# Patient Record
Sex: FEMALE | Employment: UNEMPLOYED | URBAN - METROPOLITAN AREA
[De-identification: names, ages, dates, MRNs, and addresses within clinical notes are randomized per-mention and may not be internally consistent; named-entity substitution may affect disease eponyms.]

---

## 2024-07-09 ENCOUNTER — OFFICE VISIT (OUTPATIENT)
Dept: OBGYN CLINIC | Facility: CLINIC | Age: 29
End: 2024-07-09

## 2024-07-09 VITALS
HEIGHT: 60 IN | WEIGHT: 175 LBS | SYSTOLIC BLOOD PRESSURE: 104 MMHG | DIASTOLIC BLOOD PRESSURE: 62 MMHG | BODY MASS INDEX: 34.36 KG/M2 | HEART RATE: 88 BPM

## 2024-07-09 DIAGNOSIS — R10.2 PELVIC PAIN: Primary | ICD-10-CM

## 2024-07-09 DIAGNOSIS — Z30.015 ENCOUNTER FOR INITIAL PRESCRIPTION OF VAGINAL RING HORMONAL CONTRACEPTIVE: ICD-10-CM

## 2024-07-09 DIAGNOSIS — Z11.3 SCREEN FOR STD (SEXUALLY TRANSMITTED DISEASE): ICD-10-CM

## 2024-07-09 LAB — SL AMB POCT URINE HCG: NEGATIVE

## 2024-07-09 PROCEDURE — 99203 OFFICE O/P NEW LOW 30 MIN: CPT | Performed by: OBSTETRICS & GYNECOLOGY

## 2024-07-09 PROCEDURE — 87491 CHLMYD TRACH DNA AMP PROBE: CPT

## 2024-07-09 PROCEDURE — 87591 N.GONORRHOEAE DNA AMP PROB: CPT

## 2024-07-09 PROCEDURE — 81025 URINE PREGNANCY TEST: CPT | Performed by: OBSTETRICS & GYNECOLOGY

## 2024-07-09 RX ORDER — ETONOGESTREL AND ETHINYL ESTRADIOL VAGINAL RING .015; .12 MG/D; MG/D
RING VAGINAL
Qty: 3 EACH | Refills: 4 | Status: SHIPPED | OUTPATIENT
Start: 2024-07-09

## 2024-07-09 NOTE — PROGRESS NOTES
Assessment Sherlyn was seen today for pelvic pain.    Diagnoses and all orders for this visit:    Pelvic pain  -     Chlamydia/GC amplified DNA by PCR  -     POCT urine HCG    Encounter for initial prescription of vaginal ring hormonal contraceptive  -     etonogestrel-ethinyl estradiol (NuvaRing) 0.12-0.015 MG/24HR vaginal ring; Insert vaginally and leave in place for 3 consecutive weeks, then remove for 1 week.  -     US pelvis complete non OB; Future  -     POCT urine HCG    Screen for STD (sexually transmitted disease)  -     Chlamydia/GC amplified DNA by PCR      Plan  Recommend pelvic US and conservative management of pain with tylenol, motrin and heating pads  UPT today was negative. Discussed safe sex practices and contraception, patient interested in Nuvaring. Rx sent to pharmacy  GC collected for STI screening.   Recommend f/u in 3-6 months for follow up to pain.     Subjective     Sherlyn Lozano is a 28 y.o. female here for a problem visit. She complains of right sided pelvic pain for the past 5 months, since November 2023. She says that her pain occasionally comes and goes and is occasionally constant. She has only tried teas for her pain with no resolution. She states that her pain is associated with menses. Her LMP was 5/02/2024. She states her menses are usually regular and last 4 days. She was previously using Depo for contraception with last injection in Feb 2024. She does not want to get pregnant but uses no contraception.     She declined [employer] , and requested her friend interpret for her.     There is no problem list on file for this patient.    Gynecologic History  Patient's last menstrual period was 05/02/2024 (approximate).  The current method of family planning is none.    History reviewed. No pertinent past medical history.  History reviewed. No pertinent surgical history.  Family History   Problem Relation Age of Onset    Hypertension Mother     No Known Problems Father      Social  History     Socioeconomic History    Marital status: Single     Spouse name: Not on file    Number of children: Not on file    Years of education: Not on file    Highest education level: Not on file   Occupational History    Not on file   Tobacco Use    Smoking status: Never    Smokeless tobacco: Never   Vaping Use    Vaping status: Never Used   Substance and Sexual Activity    Alcohol use: Never    Drug use: Never    Sexual activity: Yes     Partners: Male     Birth control/protection: None   Other Topics Concern    Not on file   Social History Narrative    Not on file     Social Determinants of Health     Financial Resource Strain: Medium Risk (7/9/2024)    Overall Financial Resource Strain (CARDIA)     Difficulty of Paying Living Expenses: Somewhat hard   Food Insecurity: No Food Insecurity (7/9/2024)    Hunger Vital Sign     Worried About Running Out of Food in the Last Year: Never true     Ran Out of Food in the Last Year: Never true   Transportation Needs: No Transportation Needs (7/9/2024)    PRAPARE - Transportation     Lack of Transportation (Medical): No     Lack of Transportation (Non-Medical): No   Physical Activity: Not on file   Stress: Not on file   Social Connections: Not on file   Intimate Partner Violence: Not on file   Housing Stability: High Risk (7/9/2024)    Housing Stability Vital Sign     Unable to Pay for Housing in the Last Year: Yes     Number of Times Moved in the Last Year: 2     Homeless in the Last Year: No     Patient has no known allergies.  No current outpatient medications on file.    Review of Systems  Review of Systems - Negative except what's listed in HPI     Objective     /62   Pulse 88   Ht 5' (1.524 m)   Wt 79.4 kg (175 lb)   LMP 05/02/2024 (Approximate)   BMI 34.18 kg/m²     Physical Exam  Constitutional:       Appearance: Normal appearance.   Cardiovascular:      Rate and Rhythm: Normal rate.      Pulses: Normal pulses.   Pulmonary:      Effort: Pulmonary effort  is normal. No respiratory distress.      Breath sounds: Normal breath sounds.   Abdominal:      Palpations: Abdomen is soft.      Tenderness: There is no abdominal tenderness.   Neurological:      Mental Status: She is alert.   Skin:     General: Skin is warm and dry.   Psychiatric:         Mood and Affect: Mood normal.         Behavior: Behavior normal.   Vitals reviewed.       Jane Ponce MD  OB/GYN PGY-4  7/9/2024  10:49 AM

## 2024-07-10 LAB
C TRACH DNA SPEC QL NAA+PROBE: NEGATIVE
N GONORRHOEA DNA SPEC QL NAA+PROBE: NEGATIVE

## 2025-01-21 ENCOUNTER — PATIENT OUTREACH (OUTPATIENT)
Dept: OBGYN CLINIC | Facility: CLINIC | Age: 30
End: 2025-01-21

## 2025-01-21 ENCOUNTER — ULTRASOUND (OUTPATIENT)
Dept: OBGYN CLINIC | Facility: CLINIC | Age: 30
End: 2025-01-21

## 2025-01-21 VITALS
WEIGHT: 171.2 LBS | DIASTOLIC BLOOD PRESSURE: 74 MMHG | RESPIRATION RATE: 18 BRPM | HEIGHT: 60 IN | BODY MASS INDEX: 33.61 KG/M2 | SYSTOLIC BLOOD PRESSURE: 115 MMHG | HEART RATE: 93 BPM

## 2025-01-21 DIAGNOSIS — Z59.9 FINANCIAL DIFFICULTIES: ICD-10-CM

## 2025-01-21 DIAGNOSIS — Z3A.13 13 WEEKS GESTATION OF PREGNANCY: Primary | ICD-10-CM

## 2025-01-21 PROCEDURE — 99213 OFFICE O/P EST LOW 20 MIN: CPT | Performed by: OBSTETRICS & GYNECOLOGY

## 2025-01-21 PROCEDURE — 76801 OB US < 14 WKS SINGLE FETUS: CPT | Performed by: OBSTETRICS & GYNECOLOGY

## 2025-01-21 RX ORDER — PNV NO.95/FERROUS FUM/FOLIC AC 28MG-0.8MG
1 TABLET ORAL DAILY
Qty: 90 TABLET | Refills: 2 | Status: SHIPPED | OUTPATIENT
Start: 2025-01-21

## 2025-01-21 SDOH — ECONOMIC STABILITY - INCOME SECURITY: PROBLEM RELATED TO HOUSING AND ECONOMIC CIRCUMSTANCES, UNSPECIFIED: Z59.9

## 2025-01-21 NOTE — PROGRESS NOTES
[facility] received a referral in regard to new prenatal pt. [facility] attempted to reach pt today using [employer] services,  # [id number]. [facility] was unable to reach pt and a message was left to please return [facility] call. [facility] will remain available.

## 2025-01-21 NOTE — PROGRESS NOTES
Viability Scan    S: 29 y.o.y.o.  who presents for viability scan with approximate LMP of 10/20/24. She would be 13 weeks and 2 days by her LMP. She denies cramping or vaginal bleeding. This is an unplanned but welcomed pregnancy. This is her second pregnancy. Her last delivery in  was a term uncomplicated vaginal delivery..      O:  Vitals:    25 0848   BP: 115/74   BP Location: Left arm   Patient Position: Sitting   Cuff Size: Large   Pulse: 93   Resp: 18   Weight: 77.7 kg (171 lb 3.2 oz)   Height: 5' (1.524 m)       TAUS: viable, boggs IUP measuring 13 weeks 6 days   bpm.      CRL 8.04 cm GA 13w6d                         A/P:  #1. IUP at 13 weeks and 6 days dated by ultrasound  - Viable pregnancy on TAUS  - RT for nurse intake visit      Horacio Duque MD  OB/GYN PGY-3  2025  9:52 AM

## 2025-01-29 ENCOUNTER — INITIAL PRENATAL (OUTPATIENT)
Dept: OBGYN CLINIC | Facility: CLINIC | Age: 30
End: 2025-01-29

## 2025-01-29 DIAGNOSIS — Z34.92 PRENATAL CARE IN SECOND TRIMESTER: Primary | ICD-10-CM

## 2025-01-29 PROCEDURE — 99211 OFF/OP EST MAY X REQ PHY/QHP: CPT

## 2025-01-29 NOTE — PROGRESS NOTES
OB INTAKE INTERVIEW  Pt presents for OB intake.     OB History    Para Term  AB Living   2 1 1 0 0 1   SAB IAB Ectopic Multiple Live Births             # Outcome Date GA Lbr Nael/2nd Weight Sex Type Anes PTL Lv   2 Current            1 Term               Obstetric Comments   NSVDx1     Hx of  delivery prior to 36 weeks 6 days:  No   If yes, place a referral for cervical surveillance at 16 weeks.   Last Menstrual Period:    Patient's last menstrual period was 10/20/2024 (approximate).     Ultrasound date: 2025  13 weeks 6 days     Estimated Date of Delivery: 25   by LMP or US  H&P visit scheduled. 2025 @ 0800  with Dr. Mathis     Last pap smear: unknown date     Current Issues:  Constipation :   No  Headaches :   Yes, uses tylenol.  Cramping:  Yes  Spotting :   No  PICA cravings :  No  FOB Involved:   Yes  Planned pregnancy:  Yes  I have these concerns about this prenatal patient:   Phone intake. Pt declined [employer]  and her bilingual friend provided interpretation instead  Reminded to complete 1st trimester labs  Offer flu vaccine next visit  Desires breastfeeding. Self pay. Unable to order breast pump.      Interview education  [facility] Pregnancy Essentials reviewed and discussed   [facility] Handout  [facility] MFM Handout  Discussed genetic testing  Prenatal lab work: Scripts printed and given to pt.   Influenza vaccine given today: No  Discussed Tdap vaccine.   Immunizations:   There is no immunization history on file for this patient.  Depression Screening Follow-up Plan: Patient's depression screening was negative with an Hollis score of   0  Clinically patient does not have depression. No treatment is required..  Nurse/Family Partnership- referral placed:  Yes   If yes, place referral for nurse family partnership  Tobacco Cessation Counseling: non-smoker  Infection Screening: Does the pt have a hx of MRSA? No  If yes- please  follow MRSA protocol and obtain a nasal swab for MRSA culture  The patient was oriented to our practice and all questions were answered.  Interviewed by: zully mccray RN 01/29/25

## 2025-01-29 NOTE — LETTER
[facility] Letter       01/29/25       Sherlyn Momin  YOB: 1995  [address]  [facility] [address]         Sherlyn Momin is our patient and under our office's care.  Sherlyn's Estimated Date of Delivery: 7/23/25    Any questions or concerns feel free to contact our office.           Thank you,   [facility] [place]     [phone]   [facility]/[place]     [phone]        [facility]/[place]    [phone]    [facility]     [phone]        [facility]     [phone]        [facility]  [facility]     [phone]        [facility]     [phone]        [facility]     [phone]   [place]     [phone]   [place]     [phone]

## 2025-02-06 ENCOUNTER — PATIENT OUTREACH (OUTPATIENT)
Dept: OBGYN CLINIC | Facility: CLINIC | Age: 30
End: 2025-02-06

## 2025-02-07 ENCOUNTER — PATIENT OUTREACH (OUTPATIENT)
Dept: OBGYN CLINIC | Facility: CLINIC | Age: 30
End: 2025-02-07

## 2025-02-10 ENCOUNTER — PATIENT OUTREACH (OUTPATIENT)
Dept: OBGYN CLINIC | Facility: CLINIC | Age: 30
End: 2025-02-10

## 2025-02-11 ENCOUNTER — APPOINTMENT (OUTPATIENT)
Dept: LAB | Facility: CLINIC | Age: 30
End: 2025-02-11

## 2025-02-11 ENCOUNTER — ROUTINE PRENATAL (OUTPATIENT)
Dept: PERINATAL CARE | Facility: CLINIC | Age: 30
End: 2025-02-11

## 2025-02-11 VITALS
HEIGHT: 60 IN | BODY MASS INDEX: 34.2 KG/M2 | DIASTOLIC BLOOD PRESSURE: 60 MMHG | HEART RATE: 74 BPM | OXYGEN SATURATION: 98 % | WEIGHT: 174.2 LBS | SYSTOLIC BLOOD PRESSURE: 108 MMHG

## 2025-02-11 DIAGNOSIS — Z3A.13 13 WEEKS GESTATION OF PREGNANCY: ICD-10-CM

## 2025-02-11 DIAGNOSIS — Z36.3 ENCOUNTER FOR ANTENATAL SCREENING FOR MALFORMATION: Primary | ICD-10-CM

## 2025-02-11 DIAGNOSIS — Z3A.16 16 WEEKS GESTATION OF PREGNANCY: ICD-10-CM

## 2025-02-11 LAB
ABO GROUP BLD: NORMAL
BASOPHILS # BLD AUTO: 0.05 THOUSANDS/ÂΜL (ref 0–0.1)
BASOPHILS NFR BLD AUTO: 1 % (ref 0–1)
BILIRUB UR QL STRIP: NEGATIVE
BLD GP AB SCN SERPL QL: NEGATIVE
CLARITY UR: CLEAR
COLOR UR: COLORLESS
EOSINOPHIL # BLD AUTO: 0.07 THOUSAND/ÂΜL (ref 0–0.61)
EOSINOPHIL NFR BLD AUTO: 1 % (ref 0–6)
ERYTHROCYTE [DISTWIDTH] IN BLOOD BY AUTOMATED COUNT: 13.7 % (ref 11.6–15.1)
GLUCOSE UR STRIP-MCNC: NEGATIVE MG/DL
HCT VFR BLD AUTO: 35.5 % (ref 34.8–46.1)
HGB BLD-MCNC: 12.1 G/DL (ref 11.5–15.4)
HGB UR QL STRIP.AUTO: NEGATIVE
IMM GRANULOCYTES # BLD AUTO: 0.06 THOUSAND/UL (ref 0–0.2)
IMM GRANULOCYTES NFR BLD AUTO: 1 % (ref 0–2)
KETONES UR STRIP-MCNC: NEGATIVE MG/DL
LEUKOCYTE ESTERASE UR QL STRIP: NEGATIVE
LYMPHOCYTES # BLD AUTO: 3.31 THOUSANDS/ÂΜL (ref 0.6–4.47)
LYMPHOCYTES NFR BLD AUTO: 31 % (ref 14–44)
MCH RBC QN AUTO: 29.8 PG (ref 26.8–34.3)
MCHC RBC AUTO-ENTMCNC: 34.1 G/DL (ref 31.4–37.4)
MCV RBC AUTO: 87 FL (ref 82–98)
MONOCYTES # BLD AUTO: 0.69 THOUSAND/ÂΜL (ref 0.17–1.22)
MONOCYTES NFR BLD AUTO: 7 % (ref 4–12)
NEUTROPHILS # BLD AUTO: 6.43 THOUSANDS/ÂΜL (ref 1.85–7.62)
NEUTS SEG NFR BLD AUTO: 59 % (ref 43–75)
NITRITE UR QL STRIP: NEGATIVE
NRBC BLD AUTO-RTO: 0 /100 WBCS
PH UR STRIP.AUTO: 7 [PH]
PLATELET # BLD AUTO: 377 THOUSANDS/UL (ref 149–390)
PMV BLD AUTO: 9.2 FL (ref 8.9–12.7)
PROT UR STRIP-MCNC: NEGATIVE MG/DL
RBC # BLD AUTO: 4.06 MILLION/UL (ref 3.81–5.12)
RH BLD: POSITIVE
RUBV IGG SERPL IA-ACNC: 34.5 IU/ML
SP GR UR STRIP.AUTO: 1 (ref 1–1.03)
SPECIMEN EXPIRATION DATE: NORMAL
UROBILINOGEN UR STRIP-ACNC: <2 MG/DL
WBC # BLD AUTO: 10.61 THOUSAND/UL (ref 4.31–10.16)

## 2025-02-11 PROCEDURE — 36415 COLL VENOUS BLD VENIPUNCTURE: CPT

## 2025-02-11 PROCEDURE — 76805 OB US >/= 14 WKS SNGL FETUS: CPT | Performed by: STUDENT IN AN ORGANIZED HEALTH CARE EDUCATION/TRAINING PROGRAM

## 2025-02-11 PROCEDURE — 85025 COMPLETE CBC W/AUTO DIFF WBC: CPT

## 2025-02-11 PROCEDURE — 87086 URINE CULTURE/COLONY COUNT: CPT

## 2025-02-11 PROCEDURE — 86900 BLOOD TYPING SEROLOGIC ABO: CPT

## 2025-02-11 PROCEDURE — 86850 RBC ANTIBODY SCREEN: CPT

## 2025-02-11 PROCEDURE — 81003 URINALYSIS AUTO W/O SCOPE: CPT

## 2025-02-11 PROCEDURE — 86901 BLOOD TYPING SEROLOGIC RH(D): CPT

## 2025-02-11 PROCEDURE — 86762 RUBELLA ANTIBODY: CPT

## 2025-02-11 PROCEDURE — 87389 HIV-1 AG W/HIV-1&-2 AB AG IA: CPT

## 2025-02-11 PROCEDURE — 86780 TREPONEMA PALLIDUM: CPT

## 2025-02-11 PROCEDURE — 87340 HEPATITIS B SURFACE AG IA: CPT

## 2025-02-11 PROCEDURE — 86706 HEP B SURFACE ANTIBODY: CPT

## 2025-02-11 PROCEDURE — 86803 HEPATITIS C AB TEST: CPT

## 2025-02-11 PROCEDURE — 99243 OFF/OP CNSLTJ NEW/EST LOW 30: CPT | Performed by: STUDENT IN AN ORGANIZED HEALTH CARE EDUCATION/TRAINING PROGRAM

## 2025-02-11 NOTE — PROGRESS NOTES
This patient received  care under my supervision on 25 at 16w6d gestational age at [facility].  The note is contained in the ultrasound report located under OB Procedures tab in Epic.  Please call our office at [phone] with questions.  -Loli Fry MD

## 2025-02-12 LAB
BACTERIA UR CULT: NORMAL
HBV SURFACE AB SER-ACNC: <3 MIU/ML
HBV SURFACE AG SER QL: NORMAL
HCV AB SER QL: NORMAL
HIV 1+2 AB+HIV1 P24 AG SERPL QL IA: NORMAL
TREPONEMA PALLIDUM IGG+IGM AB [PRESENCE] IN SERUM OR PLASMA BY IMMUNOASSAY: NEGATIVE

## 2025-02-15 PROBLEM — Z78.9 NONIMMUNE TO HEPATITIS B VIRUS: Status: ACTIVE | Noted: 2025-02-15

## 2025-02-15 PROBLEM — Z3A.17 17 WEEKS GESTATION OF PREGNANCY: Status: ACTIVE | Noted: 2025-02-15

## 2025-02-15 NOTE — PROGRESS NOTES
OB/GYN PRENATAL H&P  Sherlyn Momin  2025  8:23 AM  Dr. Loli Mathis MD     [id number] used to conduct visit.     SUBJECTIVE:  Sherlyn Momin is a 29 y.o.  at 17w5d presenting for initial prenatal H&P. This is an unintended but desired pregnancy dated by first trimester ultrasound. Today, she has no complaints and denies cramping/contractions, vaginal bleeding, loss of fluid. She notes some occasional right sided pelvic pain that radiates to her groin and is worse with standing and certain positions.     She also has a cough that started on Friday and some nasal congestion. Denies fevers, chills, shortness of breath, chest pain.     STI history: none  History of or exposure to TB: none  History of MRSA: none  Family history of developmental delay, intellectual disability, or inheritable conditions: none  Recent/future travel outside of the country: none  Substance use this pregnancy (e.g., alcohol, smoking, vaping, marijuana, heroine, cocaine, other substances): none      OB History    Para Term  AB Living   2 1 1 0 0 1   SAB IAB Ectopic Multiple Live Births   0 0 0 0 0      # Outcome Date GA Lbr Nael/2nd Weight Sex Type Anes PTL Lv   2 Current            1 Term               Obstetric Comments   NSVDx1       Review of Systems   Constitutional:  Negative for chills and fever.   Respiratory:  Positive for cough. Negative for shortness of breath.    Cardiovascular:  Negative for chest pain.   Gastrointestinal:  Negative for abdominal pain.   Genitourinary:  Negative for vaginal bleeding.   Psychiatric/Behavioral: Negative.         History reviewed. No pertinent past medical history.    History reviewed. No pertinent surgical history.    Social History     Socioeconomic History    Marital status: Single     Spouse name: Not on file    Number of children: Not on file    Years of education: Not on file    Highest education level: Not on file   Occupational History    Not  on file   Tobacco Use    Smoking status: Never    Smokeless tobacco: Never   Vaping Use    Vaping status: Never Used   Substance and Sexual Activity    Alcohol use: Never    Drug use: Never    Sexual activity: Yes     Partners: Male     Birth control/protection: None   Other Topics Concern    Not on file   Social History Narrative    Not on file     Social Drivers of Health     Financial Resource Strain: Medium Risk (1/21/2025)    Overall Financial Resource Strain (CARDIA)     Difficulty of Paying Living Expenses: Somewhat hard   Food Insecurity: No Food Insecurity (1/21/2025)    Hunger Vital Sign     Worried About Running Out of Food in the Last Year: Never true     Ran Out of Food in the Last Year: Never true   Transportation Needs: No Transportation Needs (1/21/2025)    PRAPARE - Transportation     Lack of Transportation (Medical): No     Lack of Transportation (Non-Medical): No   Physical Activity: Not on file   Stress: Not on file   Social Connections: Not on file   Intimate Partner Violence: Not on file   Housing Stability: Low Risk  (1/21/2025)    Housing Stability Vital Sign     Unable to Pay for Housing in the Last Year: No     Number of Times Moved in the Last Year: 1     Homeless in the Last Year: No       OBJECTIVE:    Vitals:  Vitals:    02/17/25 0812   BP: 95/61   Pulse: 97   Resp: 18       Weight gain: Patient's BMI is 33. Recommended weight gain is 11-20 lbs.       Physical Exam  Constitutional:       General: She is not in acute distress.     Appearance: Normal appearance.   Genitourinary:      Genitourinary Comments: Normal appearing external female genitalia, normal appearing urethral meatus. On speculum exam, normal appearing vaginal epithelium, no vaginal discharge, no bleeding, grossly normal appearing cervix, cervical ectropion noted.    HENT:      Head: Normocephalic and atraumatic.   Eyes:      Extraocular Movements: Extraocular movements intact.   Cardiovascular:      Rate and Rhythm:  Normal rate and regular rhythm.   Pulmonary:      Effort: Pulmonary effort is normal. No respiratory distress.      Breath sounds: Normal breath sounds. No stridor. No wheezing.   Neurological:      Mental Status: She is alert.   Psychiatric:         Mood and Affect: Mood normal.         Behavior: Behavior normal.       FHT: 147 bpm      ASSESSMENT / PLAN:    Sherlyn Momin is a 29 y.o.  at 17w5d presenting for initial prenatal H&P.    Problem List       17 weeks gestation of pregnancy    Overview   Labs  Pap smear collected 2025 and GC/CT collected 2025  Prenatal panel reviewed wnl except Hep B NI  MSAFP ordered 2025  Genetic screening: discussed options, will follow-up with [facility]  Vaccines:  Flu: will offer during flu season  Tdap: will offer after 27 weeks  Rhogam: not indicated, O pos  Contraception: **  Feeding plan: **  Birth plan:  with** epidural  Ultrasounds:   25 16w6d: anterior placenta, normal growth .  Next US 3/12/2025           Nonimmune to hepatitis B virus     Other Visit Diagnoses         Prenatal care in second trimester    -  Primary          Return to clinic in 4 weeks.       Loli Mathis MD  2025  8:23 AM

## 2025-02-17 ENCOUNTER — ROUTINE PRENATAL (OUTPATIENT)
Dept: OBGYN CLINIC | Facility: CLINIC | Age: 30
End: 2025-02-17

## 2025-02-17 VITALS
HEART RATE: 97 BPM | HEIGHT: 60 IN | WEIGHT: 169 LBS | DIASTOLIC BLOOD PRESSURE: 61 MMHG | RESPIRATION RATE: 18 BRPM | SYSTOLIC BLOOD PRESSURE: 95 MMHG | BODY MASS INDEX: 33.18 KG/M2

## 2025-02-17 DIAGNOSIS — Z3A.17 17 WEEKS GESTATION OF PREGNANCY: ICD-10-CM

## 2025-02-17 DIAGNOSIS — Z34.92 PRENATAL CARE IN SECOND TRIMESTER: Primary | ICD-10-CM

## 2025-02-17 PROCEDURE — 87491 CHLMYD TRACH DNA AMP PROBE: CPT

## 2025-02-17 PROCEDURE — G0145 SCR C/V CYTO,THINLAYER,RESCR: HCPCS

## 2025-02-17 PROCEDURE — 87591 N.GONORRHOEAE DNA AMP PROB: CPT

## 2025-02-17 PROCEDURE — 99213 OFFICE O/P EST LOW 20 MIN: CPT | Performed by: OBSTETRICS & GYNECOLOGY

## 2025-02-17 RX ORDER — BENZONATATE 100 MG/1
100 CAPSULE ORAL 3 TIMES DAILY PRN
Qty: 20 CAPSULE | Refills: 0 | Status: SHIPPED | OUTPATIENT
Start: 2025-02-17

## 2025-02-19 LAB
C TRACH DNA SPEC QL NAA+PROBE: NEGATIVE
N GONORRHOEA DNA SPEC QL NAA+PROBE: NEGATIVE

## 2025-02-26 LAB
LAB AP GYN PRIMARY INTERPRETATION: NORMAL
LAB AP LMP: NORMAL
Lab: NORMAL

## 2025-02-28 ENCOUNTER — TELEPHONE (OUTPATIENT)
Dept: OBGYN CLINIC | Facility: CLINIC | Age: 30
End: 2025-02-28

## 2025-02-28 NOTE — TELEPHONE ENCOUNTER
----- Message from Loli Mathis MD sent at 2/27/2025 11:22 AM EST -----  Hi -     Could you let this patient know her pap smear was normal and her gonorrhea and chlamydia were negative. Next pap will be due in 3 years. Thank you!      Loli Mathis       [id number] assisted in leaving a vm informing pt of normal pap and gcct results and the provider recommendation for a repeat pa in 3 yrs.

## 2025-03-12 ENCOUNTER — ROUTINE PRENATAL (OUTPATIENT)
Dept: PERINATAL CARE | Facility: CLINIC | Age: 30
End: 2025-03-12

## 2025-03-12 VITALS
BODY MASS INDEX: 33.65 KG/M2 | HEIGHT: 60 IN | SYSTOLIC BLOOD PRESSURE: 104 MMHG | HEART RATE: 74 BPM | WEIGHT: 171.4 LBS | OXYGEN SATURATION: 98 % | DIASTOLIC BLOOD PRESSURE: 56 MMHG

## 2025-03-12 DIAGNOSIS — Z36.3 ENCOUNTER FOR ANTENATAL SCREENING FOR MALFORMATIONS: Primary | ICD-10-CM

## 2025-03-12 DIAGNOSIS — Z3A.21 21 WEEKS GESTATION OF PREGNANCY: ICD-10-CM

## 2025-03-12 DIAGNOSIS — O99.212 OBESITY AFFECTING PREGNANCY IN SECOND TRIMESTER, UNSPECIFIED OBESITY TYPE: ICD-10-CM

## 2025-03-12 DIAGNOSIS — Z36.86 ENCOUNTER FOR ANTENATAL SCREENING FOR CERVICAL LENGTH: ICD-10-CM

## 2025-03-12 DIAGNOSIS — Z36.3 ENCOUNTER FOR ANTENATAL SCREENING FOR MALFORMATION: ICD-10-CM

## 2025-03-12 PROCEDURE — 99213 OFFICE O/P EST LOW 20 MIN: CPT | Performed by: NURSE PRACTITIONER

## 2025-03-12 PROCEDURE — 76817 TRANSVAGINAL US OBSTETRIC: CPT | Performed by: OBSTETRICS & GYNECOLOGY

## 2025-03-12 PROCEDURE — 76811 OB US DETAILED SNGL FETUS: CPT | Performed by: OBSTETRICS & GYNECOLOGY

## 2025-03-12 NOTE — PROGRESS NOTES
Ultrasound Probe Disinfection    A transvaginal ultrasound was performed.   Prior to use, disinfection was performed with High Level Disinfection Process ([employer]).  Probe serial number [id number] was used.    Brenna Hathaway  03/12/25  10:23 AM

## 2025-03-12 NOTE — PROGRESS NOTES
"[facility]: Sherlyn Momin was seen today for anatomic survey and cervical length screening ultrasound.  See ultrasound report under \"OB Procedures\" tab.  I interpreted the ultrasound. I supervised the advanced practitioner and agree with the medical decision making that is documented and was reviewed with me.  Please don't hesitate to contact our office with any concerns or questions.  -Cat King MD      "

## 2025-03-15 PROBLEM — Z3A.21 21 WEEKS GESTATION OF PREGNANCY: Status: ACTIVE | Noted: 2025-02-15

## 2025-03-15 PROBLEM — O99.210 OBESITY AFFECTING PREGNANCY: Status: ACTIVE | Noted: 2025-03-15

## 2025-03-20 ENCOUNTER — ROUTINE PRENATAL (OUTPATIENT)
Dept: OBGYN CLINIC | Facility: CLINIC | Age: 30
End: 2025-03-20

## 2025-03-20 VITALS
SYSTOLIC BLOOD PRESSURE: 104 MMHG | RESPIRATION RATE: 18 BRPM | HEIGHT: 60 IN | DIASTOLIC BLOOD PRESSURE: 64 MMHG | BODY MASS INDEX: 33.61 KG/M2 | HEART RATE: 82 BPM | WEIGHT: 171.2 LBS

## 2025-03-20 DIAGNOSIS — Z3A.22 22 WEEKS GESTATION OF PREGNANCY: Primary | ICD-10-CM

## 2025-03-20 NOTE — PROGRESS NOTES
OB/GYN prenatal visit    S: 29 y.o.  22w1d here for PN visit. She has obstetric complaints, including pelvic pain, contractions, vaginal bleeding, loss of fluid, or decreased fetal movement.     O:  Vitals:    25 0933   BP: 104/64   Pulse: 82   Resp: 18       General: AAOX3. No acute distress  Cardiovascular: Regular, Rate and Rhythm, no murmur, gallop or rub   Lungs: Clear to Auscultation Bilaterally, no wheezing, rhonchi or rales   Abdominal: Soft. NT/ND. Gravid    Fundal height: difficult to assess due to body habitus  FHT: 147 bpm      Plan discussed with Dr. Nguyen          A/P:  IUP at 22w1d  ANNABEL: 25  Labs  Pap smear collected 2025 and GC/CT collected 2025  Prenatal panel reviewed wnl except Hep B NI  MSAFP ordered 2025: not completed within timeframe  Genetic screening: discussed options, will follow-up with M  28 weeks labs ordered  Vaccines:  Flu: will offer during flu season  Tdap: will offer after 27 weeks  Rhogam: not indicated, O pos  Contraception: undecided  Feeding plan: breast feeding and formula feeding   Birth plan:  with epidural- remains undecided  Ultrasounds:   25 16w6d: anterior placenta, normal growth .  3/12/2025 20w6d: anterior placenta, normal growth, vertex  Recommend 32 w growth scan for obesity   - F/U in 4 weeks     OB History    Para Term  AB Living   2 1 1 0 0 1   SAB IAB Ectopic Multiple Live Births             # Outcome Date GA Lbr Nael/2nd Weight Sex Type Anes PTL Lv   2 Current            1 Term               Obstetric Comments   NSVDx1         COVID 19 precautions were discussed with patient at length, reviewed symptoms, hygiene, social distancing, patient to call office  with questions/concerns        eMrary Dunn MD  3/20/2025  2:50 PM

## 2025-04-14 ENCOUNTER — APPOINTMENT (OUTPATIENT)
Dept: LAB | Facility: CLINIC | Age: 30
End: 2025-04-14

## 2025-04-14 DIAGNOSIS — Z3A.22 22 WEEKS GESTATION OF PREGNANCY: ICD-10-CM

## 2025-04-14 DIAGNOSIS — Z34.92 PRENATAL CARE IN SECOND TRIMESTER: ICD-10-CM

## 2025-04-14 LAB
ERYTHROCYTE [DISTWIDTH] IN BLOOD BY AUTOMATED COUNT: 14.4 % (ref 11.6–15.1)
GLUCOSE 1H P 50 G GLC PO SERPL-MCNC: 156 MG/DL (ref 70–134)
HCT VFR BLD AUTO: 38.1 % (ref 34.8–46.1)
HGB BLD-MCNC: 12.6 G/DL (ref 11.5–15.4)
MCH RBC QN AUTO: 29.8 PG (ref 26.8–34.3)
MCHC RBC AUTO-ENTMCNC: 33.1 G/DL (ref 31.4–37.4)
MCV RBC AUTO: 90 FL (ref 82–98)
PLATELET # BLD AUTO: 404 THOUSANDS/UL (ref 149–390)
PMV BLD AUTO: 8.9 FL (ref 8.9–12.7)
RBC # BLD AUTO: 4.23 MILLION/UL (ref 3.81–5.12)
WBC # BLD AUTO: 9.03 THOUSAND/UL (ref 4.31–10.16)

## 2025-04-14 PROCEDURE — 82950 GLUCOSE TEST: CPT

## 2025-04-14 PROCEDURE — 36415 COLL VENOUS BLD VENIPUNCTURE: CPT

## 2025-04-14 PROCEDURE — 86780 TREPONEMA PALLIDUM: CPT

## 2025-04-14 PROCEDURE — 85027 COMPLETE CBC AUTOMATED: CPT

## 2025-04-14 PROCEDURE — 82105 ALPHA-FETOPROTEIN SERUM: CPT

## 2025-04-15 LAB — TREPONEMA PALLIDUM IGG+IGM AB [PRESENCE] IN SERUM OR PLASMA BY IMMUNOASSAY: NORMAL

## 2025-04-16 LAB
2ND TRIMESTER 4 SCREEN SERPL-IMP: NORMAL
AFP ADJ MOM SERPL: NORMAL
AFP INTERP AMN-IMP: NORMAL
AFP INTERP SERPL-IMP: NORMAL
AFP INTERP SERPL-IMP: NORMAL
AFP SERPL-MCNC: 50.2 NG/ML
AGE AT DELIVERY: 27.7 YR
GA METHOD: NORMAL
GA: 25.9 WEEKS
IDDM PATIENT QL: NO
MULTIPLE PREGNANCY: NO
NEURAL TUBE DEFECT RISK FETUS: NORMAL %

## 2025-04-17 ENCOUNTER — ROUTINE PRENATAL (OUTPATIENT)
Dept: OBGYN CLINIC | Facility: CLINIC | Age: 30
End: 2025-04-17

## 2025-04-17 VITALS
BODY MASS INDEX: 34.47 KG/M2 | HEIGHT: 60 IN | SYSTOLIC BLOOD PRESSURE: 111 MMHG | WEIGHT: 175.6 LBS | HEART RATE: 77 BPM | DIASTOLIC BLOOD PRESSURE: 71 MMHG

## 2025-04-17 DIAGNOSIS — Z34.82 PRENATAL CARE, SUBSEQUENT PREGNANCY IN SECOND TRIMESTER: ICD-10-CM

## 2025-04-17 DIAGNOSIS — Z3A.26 26 WEEKS GESTATION OF PREGNANCY: Primary | ICD-10-CM

## 2025-04-17 DIAGNOSIS — R73.09 ELEVATED GLUCOSE TOLERANCE TEST: ICD-10-CM

## 2025-04-17 PROCEDURE — 99213 OFFICE O/P EST LOW 20 MIN: CPT | Performed by: NURSE PRACTITIONER

## 2025-04-17 NOTE — PROGRESS NOTES
Sherlyn Momin presents today for routine OB visit at 26w1d. She is a  with a history of one full term .     Blood Pressure: 111/71  Wt=79.7 kg (175 lb 9.6 oz); Body mass index is 34.29 kg/m².; TWG=2.087 kg (4 lb 9.6 oz)  Fetal Heart Rate: 145; Fundal Height (cm): 27 cm  Abdomen: gravid, soft, non-tender.  She reports no complaints.  Denies uterine contractions or persistent cramping.  Denies vaginal bleeding or leaking of fluid.  Reports fetal movement.  Scheduled for ultrasound 25.    Reviewed elevated 1 hour GTT. Orders placed for 3 hr GTT.   Reviewed common discomforts of pregnancy in second trimester and warning signs.  Advised to continue medications and return in 2 weeks.      Current Outpatient Medications   Medication Instructions    benzonatate (TESSALON PERLES) 100 mg, Oral, 3 times daily PRN    Prenatal Vit-Fe Fumarate-FA (Prenatal Vitamin) 27-0.8 MG TABS 1 capsule, Oral, Daily         G2 Problems (from 25 to present)       Problem Noted Diagnosed Resolved    Elevated glucose tolerance test 2025 by FRANCISCA Jason  No    Overview Signed 2025 11:18 AM by FRANCISCA Jason   1 hr , orders placed for 3 hr GTT          Obesity affecting pregnancy 3/15/2025 by Krista Pavon MD  No    Overview Signed 3/15/2025 11:19 PM by Krista Pavon MD   Pre-gravid BMI 33  Current BMI 33  TWG 0.181 kg (6.4 oz)    Recommend 11-20 lb weight gain this pregnancy  Recommend 150 min of moderate exercise weekly  Recommend 32w growth scan         26 weeks gestation of pregnancy 2/15/2025 by Loli Mathis MD  No    Overview Addendum 2025 11:19 AM by FRANCISCA Jason   ANNABEL: 25  Labs  Pap smear NILM and GC/CT negative   Prenatal panel reviewed wnl except Hep B NI  MSAFP ordered 2025: not completed within timeframe  Declined NIPS. MSAFP negative.   28 weeks labs: GTT elevated. 3 hr GTT ordered.   Vaccines:  Flu: will offer during flu season  Tdap: will  offer after 27 weeks  Rhogam: not indicated, O pos  Contraception: undecided  Feeding plan: breast feeding and formula feeding   Birth plan:  with epidural- remains undecided  Ultrasounds:   25 16w6d: anterior placenta, normal growth .  3/12/2025 20w6d: anterior placenta, normal growth, vertex  Recommend 32w growth scan for obesity         Nonimmune to hepatitis B virus 2/15/2025 by Loli Mathis MD  No    Overview Signed 3/15/2025 11:13 PM by Krista Pavon MD   Recommend antepartum vaccination through PCP

## 2025-04-28 ENCOUNTER — APPOINTMENT (OUTPATIENT)
Dept: LAB | Facility: CLINIC | Age: 30
End: 2025-04-28
Attending: NURSE PRACTITIONER

## 2025-04-28 DIAGNOSIS — R73.09 ELEVATED GLUCOSE TOLERANCE TEST: ICD-10-CM

## 2025-04-28 DIAGNOSIS — Z3A.26 26 WEEKS GESTATION OF PREGNANCY: ICD-10-CM

## 2025-04-28 DIAGNOSIS — Z34.82 PRENATAL CARE, SUBSEQUENT PREGNANCY IN SECOND TRIMESTER: ICD-10-CM

## 2025-04-28 LAB
GLUCOSE 1H P 100 G GLC PO SERPL-MCNC: 166 MG/DL (ref 65–179)
GLUCOSE 2H P 100 G GLC PO SERPL-MCNC: 133 MG/DL (ref 65–154)
GLUCOSE 3H P 100 G GLC PO SERPL-MCNC: 77 MG/DL (ref 65–139)
GLUCOSE P FAST SERPL-MCNC: 84 MG/DL (ref 65–94)

## 2025-04-28 PROCEDURE — 82951 GLUCOSE TOLERANCE TEST (GTT): CPT

## 2025-04-28 PROCEDURE — 36415 COLL VENOUS BLD VENIPUNCTURE: CPT

## 2025-04-28 PROCEDURE — 82952 GTT-ADDED SAMPLES: CPT

## 2025-05-02 ENCOUNTER — RESULTS FOLLOW-UP (OUTPATIENT)
Dept: OBGYN CLINIC | Facility: CLINIC | Age: 30
End: 2025-05-02

## 2025-05-02 NOTE — TELEPHONE ENCOUNTER
Called pt and informed of results, pt verbalized understanding.      ----- Message from FRANCISCA Jason sent at 4/30/2025  8:13 AM EDT -----  Please let her know she passed the 3 hour glucose test. Thank you!

## 2025-05-05 ENCOUNTER — ROUTINE PRENATAL (OUTPATIENT)
Dept: OBGYN CLINIC | Facility: CLINIC | Age: 30
End: 2025-05-05

## 2025-05-05 VITALS
WEIGHT: 178 LBS | SYSTOLIC BLOOD PRESSURE: 100 MMHG | RESPIRATION RATE: 18 BRPM | DIASTOLIC BLOOD PRESSURE: 65 MMHG | BODY MASS INDEX: 34.95 KG/M2 | HEART RATE: 81 BPM | HEIGHT: 60 IN

## 2025-05-05 DIAGNOSIS — Z78.9 NONIMMUNE TO HEPATITIS B VIRUS: ICD-10-CM

## 2025-05-05 DIAGNOSIS — Z23 ENCOUNTER FOR IMMUNIZATION: ICD-10-CM

## 2025-05-05 DIAGNOSIS — Z3A.28 28 WEEKS GESTATION OF PREGNANCY: Primary | ICD-10-CM

## 2025-05-05 DIAGNOSIS — R73.09 ELEVATED GLUCOSE TOLERANCE TEST: ICD-10-CM

## 2025-05-05 DIAGNOSIS — O99.212 OBESITY AFFECTING PREGNANCY IN SECOND TRIMESTER, UNSPECIFIED OBESITY TYPE: ICD-10-CM

## 2025-05-05 PROCEDURE — 90471 IMMUNIZATION ADMIN: CPT | Performed by: OBSTETRICS & GYNECOLOGY

## 2025-05-05 PROCEDURE — 90715 TDAP VACCINE 7 YRS/> IM: CPT | Performed by: OBSTETRICS & GYNECOLOGY

## 2025-05-05 PROCEDURE — 99213 OFFICE O/P EST LOW 20 MIN: CPT | Performed by: OBSTETRICS & GYNECOLOGY

## 2025-05-05 NOTE — PROGRESS NOTES
[id number]      28 week education packet provided to patient on 05/05/25.    Included in packet:  Third Trimester paperwork  Delivery consent   Birthing room support person rules and acknowledgment  Birth Plan   Welcome information  Birth certificate worksheet   Consent for Photographers  Perineal/ Vaginal massage   Pediatric practices and locations        Tdap vaccine given today  Pt will breast /formula feed  Pt is self pay

## 2025-05-05 NOTE — PROGRESS NOTES
OB/GYN Prenatal Visit    SUBJECTIVE:  Sherlyn Momin is a 29 y.o.  at 28w5d here for prenatal visit.  She has no obstetric complaints and denies pelvic pain, cramping/contractions, vaginal bleeding, loss of fluid, or decreased fetal movement.     I consented the patient for delivery. The patient was counseled about the labor process. We discussed three routes of delivery including spontaneous vaginal delivery, operative vaginal delivery and  delivery. The patient was counseled on the risks of vaginal delivery including pain, vaginal/perineal tears and the need for repair at the bedside, infection and bleeding.      Patient counseled on indications necessitating operative vaginal delivery including: maternal medical indications in which patient would need to avoid pushing, prolonged second stage and nonreassuring fetal heart tracing during second stage. Patient counseled on maternal risks of vaginal delivery including increase risk of tearing and hemorrhage. We also discussed fetal risks including intracranial hemorrhage, lacerations, nerve damage or fracture. Patient is aware that NICU providers would be available at the time of delivery to assess fetal status after delivery and need for resuscitation.      She was counseled on the indications for  section including: failed induction, arrest of dilation, persistent category II tracing and arrest of descent. She was counseled on the indications for emergent  section including evidence of worsening fetal or maternal status, and that there is a risk of general anesthesia. We discussed the risks of  including bleeding, infection, and injury to surrounding structures including the bowel and bladder.     She was counseled that there are medical and surgical methods to manage excessive postpartum bleeding. She was counseled that in the event of excessive blood loss, she may require blood transfusion which includes a small  risk of blood borne diseases such as hepatitis and HIV. The patient is OK with receiving a blood transfusion if necessary.  The patient had an opportunity to ask questions and signed consent.       OBJECTIVE:  Vitals:    25 1333   BP: 100/65   Pulse: 81   Resp: 18     FHT: 152 bpm  Fundal height: 28 cm    Physical Exam  Constitutional:       Appearance: Normal appearance.   HENT:      Head: Atraumatic.   Eyes:      Extraocular Movements: Extraocular movements intact.      Conjunctiva/sclera: Conjunctivae normal.   Cardiovascular:      Rate and Rhythm: Normal rate and regular rhythm.      Pulses: Normal pulses.   Pulmonary:      Effort: Pulmonary effort is normal. No respiratory distress.      Breath sounds: Normal breath sounds.   Abdominal:      Palpations: Abdomen is soft.      Tenderness: There is no abdominal tenderness.      Comments: Gravid   Neurological:      General: No focal deficit present.      Mental Status: She is alert and oriented to person, place, and time.   Skin:     General: Skin is warm and dry.   Psychiatric:         Mood and Affect: Mood normal.         Behavior: Behavior normal.   Vitals reviewed. Exam conducted with a chaperone present.       ASSESSMENT / PLAN:    Problem List       28 weeks gestation of pregnancy    Overview   ANNABEL: 25  Labs  Pap smear NILM and GC/CT negative   Prenatal panel reviewed wnl except Hep B NI  MSAFP ordered 2025: not completed within timeframe  Declined NIPS. MSAFP negative.   28 weeks labs: GTT elevated. 3 hr GTT normal.   Vaccines:  Flu: will offer during flu season  Tdap: received 25  Rhogam: not indicated, O pos  Contraception: undecided  Feeding plan: breast feeding and formula feeding   Birth plan:  with epidural- remains undecided  Delivery consent: signed 25  Ultrasounds:   25 16w6d: anterior placenta, normal growth .  3/12/2025 20w6d: anterior placenta, normal growth, vertex  Recommend 32w growth scan for obesity          Nonimmune to hepatitis B virus    Overview   Recommend antepartum vaccination through PCP         Obesity affecting pregnancy    Overview   Pre-gravid BMI 33  Current BMI 33  TWG 0.181 kg (6.4 oz)    Recommend 11-20 lb weight gain this pregnancy  Recommend 150 min of moderate exercise weekly  Recommend 32w growth scan         Elevated glucose tolerance test    Overview   1 hr , normal 3 hr GTT          Return in 2 weeks      Roseanna Negro MD  5/5/2025  1:58 PM

## 2025-05-19 ENCOUNTER — ROUTINE PRENATAL (OUTPATIENT)
Dept: OBGYN CLINIC | Facility: CLINIC | Age: 30
End: 2025-05-19

## 2025-05-19 VITALS
SYSTOLIC BLOOD PRESSURE: 101 MMHG | HEART RATE: 83 BPM | DIASTOLIC BLOOD PRESSURE: 60 MMHG | HEIGHT: 60 IN | BODY MASS INDEX: 34.75 KG/M2 | WEIGHT: 177 LBS

## 2025-05-19 DIAGNOSIS — R73.09 ELEVATED GLUCOSE TOLERANCE TEST: ICD-10-CM

## 2025-05-19 DIAGNOSIS — Z3A.30 30 WEEKS GESTATION OF PREGNANCY: Primary | ICD-10-CM

## 2025-05-19 NOTE — PROGRESS NOTES
[facility]   PRENATAL VISIT  Sherlyn Momin  [id number]  1995        A/P:  Problem List       30 weeks gestation of pregnancy    Overview   ANNABEL: 25  Labs  Pap smear NILM and GC/CT negative   Prenatal panel reviewed wnl except Hep B NI  MSAFP ordered 2025: not completed within timeframe  Declined NIPS. MSAFP negative.   28 weeks labs: GTT elevated. 3 hr GTT normal.   Vaccines:  Flu: will offer during flu season  Tdap: received 25  Rhogam: not indicated, O pos  Contraception: undecided  Feeding plan: breast feeding and formula feeding   Birth plan:  with epidural- remains undecided  Delivery consent: signed 25  Ultrasounds:   25 16w6d: anterior placenta, normal growth .  3/12/2025 20w6d: anterior placenta, normal growth, vertex  Recommend 32w growth scan for obesity (scheduled )         Nonimmune to hepatitis B virus    Overview   Recommend antepartum vaccination through PCP         Obesity affecting pregnancy    Overview   Pre-gravid BMI 33  Current BMI 33  TWG 0.181 kg (6.4 oz)    Recommend 11-20 lb weight gain this pregnancy  Recommend 150 min of moderate exercise weekly  Recommend 32w growth scan         Elevated glucose tolerance test    Overview   1 hr , normal 3 hr GTT                S: 29 y.o.  30w5d here for PN visit. She denies contractions. She denies leakage of fluid and vaginal bleeding. She has felt good fetal movement. Does get nosebleeds that resolve with pressure.    O:  Vitals:    25 1400   BP: 101/60   Pulse: 83     Physical Exam  GEN: The patient was alert and oriented x3, pleasant well-appearing female in no acute distress.   CV: Regular rate  PULM: Non-labored respirations  MSK: Normal gait  Skin: Warm, dry  Neuro: No focal deficits  Psych: Normal affect and judgement, cooperative      Fetal Heart Rate: 147       D/w Dr. Misael Brown MD  OB/GYN PGY-4  2025  2:52 PM

## 2025-05-27 NOTE — ASSESSMENT & PLAN NOTE
Class 1 obesity based on pregravid BMI. She had an elevated 1 hour with a normal 3 hour on 4/28 (all four values normal) reviewed prior to today's visit.    We reviewed today's appropriate for gestational age growth on ultrasound. No further ultrasounds have been scheduled and can be ordered as clinically indicated.

## 2025-05-28 ENCOUNTER — ANCILLARY PROCEDURE (OUTPATIENT)
Dept: PERINATAL CARE | Facility: CLINIC | Age: 30
End: 2025-05-28

## 2025-05-28 ENCOUNTER — ULTRASOUND (OUTPATIENT)
Dept: PERINATAL CARE | Facility: CLINIC | Age: 30
End: 2025-05-28

## 2025-05-28 VITALS
SYSTOLIC BLOOD PRESSURE: 108 MMHG | WEIGHT: 179.2 LBS | DIASTOLIC BLOOD PRESSURE: 64 MMHG | HEART RATE: 74 BPM | BODY MASS INDEX: 35.18 KG/M2 | HEIGHT: 60 IN

## 2025-05-28 DIAGNOSIS — Z3A.32 32 WEEKS GESTATION OF PREGNANCY: ICD-10-CM

## 2025-05-28 DIAGNOSIS — O99.213 OBESITY AFFECTING PREGNANCY IN THIRD TRIMESTER, UNSPECIFIED OBESITY TYPE: Primary | ICD-10-CM

## 2025-05-28 DIAGNOSIS — Z36.89 ENCOUNTER FOR ULTRASOUND TO CHECK FETAL GROWTH: ICD-10-CM

## 2025-05-28 PROCEDURE — NC001 PR NO CHARGE: Performed by: OBSTETRICS & GYNECOLOGY

## 2025-05-28 PROCEDURE — 76816 OB US FOLLOW-UP PER FETUS: CPT | Performed by: OBSTETRICS & GYNECOLOGY

## 2025-05-28 NOTE — PROGRESS NOTES
" [facility] return visit    HPI: Sherlyn Momin is a 29 y.o.  at 32w0d who presents today for fetal growth assessment ultrasound at our [facility]   See ultrasound report under \"imaging\" tab.         She denies any complaints today.     1 hour and 3 hour GTT results were reviewed prior to today's visit.     Vitals:    25 1138   BP: 108/64   Pulse: 74     Problem List Items Addressed This Visit       32 weeks gestation of pregnancy    Obesity affecting pregnancy - Primary    Class 1 obesity based on pregravid BMI. She had an elevated 1 hour with a normal 3 hour on  (all four values normal) reviewed prior to today's visit.    We reviewed today's appropriate for gestational age growth on ultrasound. No further ultrasounds have been scheduled and can be ordered as clinically indicated.             Other Visit Diagnoses         Encounter for ultrasound to check fetal growth                We spoke with the help of her family member providing English to [place]ian interpretation. She declined the use of a certified       Please see today's [facility] ultrasound report documented separately under \"imaging\" tab in Epic.    Please don't hesitate to contact our office with any concerns or questions.    -Cat King MD  "

## 2025-06-05 ENCOUNTER — ROUTINE PRENATAL (OUTPATIENT)
Dept: OBGYN CLINIC | Facility: CLINIC | Age: 30
End: 2025-06-05

## 2025-06-05 VITALS
SYSTOLIC BLOOD PRESSURE: 98 MMHG | HEIGHT: 60 IN | WEIGHT: 181.4 LBS | HEART RATE: 89 BPM | DIASTOLIC BLOOD PRESSURE: 63 MMHG | BODY MASS INDEX: 35.61 KG/M2

## 2025-06-05 DIAGNOSIS — Z34.83 PRENATAL CARE, SUBSEQUENT PREGNANCY IN THIRD TRIMESTER: Primary | ICD-10-CM

## 2025-06-05 DIAGNOSIS — Z3A.33 33 WEEKS GESTATION OF PREGNANCY: ICD-10-CM

## 2025-06-05 PROCEDURE — 99213 OFFICE O/P EST LOW 20 MIN: CPT | Performed by: NURSE PRACTITIONER

## 2025-06-05 NOTE — PROGRESS NOTES
Sherlyn Momin presents today for routine OB visit at 33w1d. She is a  with a history of one full term .     Blood Pressure: 98/63  Wt=82.3 kg (181 lb 6.4 oz); Body mass index is 35.43 kg/m².; TWG=4.717 kg (10 lb 6.4 oz)  Fetal Heart Rate: 130; Fundal Height (cm): 33 cm  Abdomen: gravid, soft, non-tender.  She reports no complaints, she is feeling well.  Denies uterine contractions.  Denies vaginal bleeding or leaking of fluid.  Reports adequate fetal movement of at least 10 movements in 2 hours once daily.    Reviewed premature labor precautions and fetal kick counts.  Advised to continue medications and return in 2 weeks.      Current Outpatient Medications   Medication Instructions    Prenatal Vit-Fe Fumarate-FA (Prenatal Vitamin) 27-0.8 MG TABS 1 capsule, Oral, Daily         G2 Problems (from 25 to present)       Problem Noted Diagnosed Resolved    Elevated glucose tolerance test 2025 by FRANCISCA Jason  No    Overview Addendum 2025  1:58 PM by Roseanna Negro MD   1 hr , normal 3 hr GTT         Obesity affecting pregnancy 3/15/2025 by Krista Pavon MD  No    Overview Signed 3/15/2025 11:19 PM by Krista Paovn MD   Pre-gravid BMI 33  Current BMI 33  TWG 0.181 kg (6.4 oz)    Recommend 11-20 lb weight gain this pregnancy  Recommend 150 min of moderate exercise weekly  Recommend 32w growth scan         33 weeks gestation of pregnancy 2/15/2025 by Loli Mathis MD  No    Overview Addendum 2025  1:14 PM by FRANCISCA Jason   ANNABEL: 25  Labs  Pap smear NILM and GC/CT negative   Prenatal panel reviewed wnl except Hep B NI  MSAFP ordered 2025: not completed within timeframe  Declined NIPS. MSAFP negative.   28 weeks labs: GTT elevated. 3 hr GTT normal.   Vaccines:  Flu: will offer during flu season  Tdap: received 25  Rhogam: not indicated, O pos  Contraception: undecided  Feeding plan: breast feeding and formula feeding   Birth plan:  with epidural-  remains undecided  Delivery consent: signed 5/5/25  Ultrasounds:   2/11/25 16w6d: anterior placenta, normal growth .  3/12/2025 20w6d: anterior placenta, normal growth, vertex. Recommend 32w growth scan for obesity (scheduled 5/28)  5/28/25 normal growth. Return as clinically indicated.          Nonimmune to hepatitis B virus 2/15/2025 by Loli Mathis MD  No    Overview Signed 3/15/2025 11:13 PM by Krista Pavon MD   Recommend antepartum vaccination through PCP

## 2025-06-25 ENCOUNTER — ROUTINE PRENATAL (OUTPATIENT)
Dept: OBGYN CLINIC | Facility: CLINIC | Age: 30
End: 2025-06-25

## 2025-06-25 VITALS
HEIGHT: 60 IN | DIASTOLIC BLOOD PRESSURE: 62 MMHG | BODY MASS INDEX: 35.34 KG/M2 | HEART RATE: 75 BPM | WEIGHT: 180 LBS | SYSTOLIC BLOOD PRESSURE: 99 MMHG

## 2025-06-25 DIAGNOSIS — Z34.83 PRENATAL CARE, SUBSEQUENT PREGNANCY IN THIRD TRIMESTER: ICD-10-CM

## 2025-06-25 DIAGNOSIS — Z3A.36 36 WEEKS GESTATION OF PREGNANCY: Primary | ICD-10-CM

## 2025-06-25 PROBLEM — Z3A.37 37 WEEKS GESTATION OF PREGNANCY: Status: ACTIVE | Noted: 2025-02-15

## 2025-06-25 PROCEDURE — 99213 OFFICE O/P EST LOW 20 MIN: CPT | Performed by: NURSE PRACTITIONER

## 2025-06-25 PROCEDURE — 87150 DNA/RNA AMPLIFIED PROBE: CPT | Performed by: NURSE PRACTITIONER

## 2025-06-25 NOTE — PROGRESS NOTES
Sherlyn Momin presents today for routine OB visit at 36w0d. She is a  with a history of one full term .     Blood Pressure: 99/62  Wt=81.6 kg (180 lb); Body mass index is 35.15 kg/m².; TWG=4.082 kg (9 lb)  Fetal Heart Rate: 150; Fundal Height (cm): 36 cm  Abdomen: gravid, soft, non-tender.  She reports no complaints.  Denies uterine contractions.  Denies vaginal bleeding or leaking of fluid.  Reports adequate fetal movement of at least 10 movements in 2 hours once daily.    GBS culture collected.   Reviewed premature labor precautions and fetal kick counts.  Advised to continue medications and return in 1 weeks.      Current Outpatient Medications   Medication Instructions    Prenatal Vit-Fe Fumarate-FA (Prenatal Vitamin) 27-0.8 MG TABS 1 capsule, Oral, Daily         G2 Problems (from 25 to present)       Problem Noted Diagnosed Resolved    Elevated glucose tolerance test 2025 by FRANCISCA Jason  No    Overview Addendum 2025  1:58 PM by Roseanna Negro MD   1 hr , normal 3 hr GTT         Obesity affecting pregnancy 3/15/2025 by Krista Pavon MD  No    Overview Signed 3/15/2025 11:19 PM by Krista Pavon MD   Pre-gravid BMI 33  Current BMI 33  TWG 0.181 kg (6.4 oz)    Recommend 11-20 lb weight gain this pregnancy  Recommend 150 min of moderate exercise weekly  Recommend 32w growth scan         36 weeks gestation of pregnancy 2/15/2025 by Loli Mathis MD  No    Overview Addendum 2025  1:38 PM by FRANCISCA Jason   ANNABEL: 25  Labs  Pap smear NILM and GC/CT negative   Prenatal panel reviewed wnl except Hep B NI  MSAFP ordered 2025: not completed within timeframe  Declined NIPS. MSAFP negative.   28 weeks labs: GTT elevated. 3 hr GTT normal.   GBS collected 25  Vaccines:  Flu: will offer during flu season  Tdap: received 25  Rhogam: not indicated, O pos  Contraception: undecided  Feeding plan: breast feeding and formula feeding   Birth plan:   with epidural- remains undecided  Delivery consent: signed 5/5/25  Ultrasounds:   2/11/25 16w6d: anterior placenta, normal growth .  3/12/2025 20w6d: anterior placenta, normal growth, vertex. Recommend 32w growth scan for obesity (scheduled 5/28)  5/28/25 normal growth. Return as clinically indicated.          Nonimmune to hepatitis B virus 2/15/2025 by Loli Mathis MD  No    Overview Signed 3/15/2025 11:13 PM by Krista Pavon MD   Recommend antepartum vaccination through PCP

## 2025-06-27 LAB — GP B STREP DNA SPEC QL NAA+PROBE: NEGATIVE

## 2025-07-01 NOTE — PROGRESS NOTES
----- Message from Colleen BENNETT CMA sent at 9/4/2024 10:48 AM CDT -----  Regarding: bp readings  Obtain at home blood pressure readings   OB/GYN Prenatal Visit    ASSESSMENT / PLAN:  Problem List       37 weeks gestation of pregnancy    Overview   ANNABEL: 25  Labs  Pap smear NILM and GC/CT negative   Prenatal panel reviewed wnl except Hep B NI  MSAFP ordered 2025: not completed within timeframe  Declined NIPS. MSAFP negative.   28 weeks labs: GTT elevated. 3 hr GTT normal.   GBS negative  Vaccines:  Flu: will offer during flu season  Tdap: received 25  Rhogam: not indicated, O pos  Contraception: undecided  Feeding plan: breast feeding and formula feeding   Birth plan:  with epidural- remains undecided  Delivery consent: signed 25  Ultrasounds:   25 16w6d: anterior placenta, normal growth .  3/12/2025 20w6d: anterior placenta, normal growth, vertex. Recommend 32w growth scan for obesity (scheduled )  25 normal growth. Return as clinically indicated.          Nonimmune to hepatitis B virus    Overview   Recommend antepartum vaccination through PCP         Obesity affecting pregnancy    Overview   Pre-gravid BMI 33  Current BMI 33  TWG 4.082 kg (9 lb)    Recommend 11-20 lb weight gain this pregnancy  Recommend 150 min of moderate exercise weekly  32w growth scan normal growth per notes, report not completed          Elevated glucose tolerance test    Overview   1 hr , normal 3 hr GTT          Other Visit Diagnoses         Prenatal care in third trimester, unspecified     -  Primary          Return to clinic in 1 week for prenatal visit.       SUBJECTIVE:   [id number] used to conduct visit.     Sherlyn Momin is a 29 y.o.  at 37w0d here for prenatal visit.  She has no obstetric complaints and denies pelvic pain, vaginal bleeding, loss of fluid, or decreased fetal movement. She feels occasional contractions, especially if she walks a lot. Denies regular contractions.     OBJECTIVE:  Vitals:    25 1313   BP: 99/62   Pulse: 71   Resp: 18       TW.534 kg (12 lb 3.2 oz)  Weight  gain: Patient's BMI is 33. Recommended weight gain is 11-20 lbs.     FHT: 144 bpm  Fundal height: 37 cm  SVE: declined    Physical Exam:    General: Well appearing, no distress  Respiratory: Unlabored breathing  Cardiovascular: Regular rate.  Abdomen: Soft, gravid, nontender  Fundal Height: Appropriate for gestational age.  Extremities: Warm and well perfused.  Non tender.      Loli Mathis MD  7/2/2025  1:27 PM

## 2025-07-02 ENCOUNTER — ROUTINE PRENATAL (OUTPATIENT)
Dept: OBGYN CLINIC | Facility: CLINIC | Age: 30
End: 2025-07-02

## 2025-07-02 VITALS
SYSTOLIC BLOOD PRESSURE: 99 MMHG | HEART RATE: 71 BPM | HEIGHT: 60 IN | DIASTOLIC BLOOD PRESSURE: 62 MMHG | RESPIRATION RATE: 18 BRPM | WEIGHT: 183.2 LBS | BODY MASS INDEX: 35.97 KG/M2

## 2025-07-02 DIAGNOSIS — Z3A.37 37 WEEKS GESTATION OF PREGNANCY: ICD-10-CM

## 2025-07-02 DIAGNOSIS — Z34.93 PRENATAL CARE IN THIRD TRIMESTER, UNSPECIFIED GRAVIDITY: Primary | ICD-10-CM

## 2025-07-02 PROCEDURE — 99213 OFFICE O/P EST LOW 20 MIN: CPT | Performed by: OBSTETRICS & GYNECOLOGY

## 2025-07-09 ENCOUNTER — ROUTINE PRENATAL (OUTPATIENT)
Dept: OBGYN CLINIC | Facility: CLINIC | Age: 30
End: 2025-07-09

## 2025-07-09 VITALS
SYSTOLIC BLOOD PRESSURE: 110 MMHG | WEIGHT: 185.8 LBS | HEART RATE: 72 BPM | BODY MASS INDEX: 36.48 KG/M2 | HEIGHT: 60 IN | DIASTOLIC BLOOD PRESSURE: 73 MMHG

## 2025-07-09 DIAGNOSIS — Z3A.38 38 WEEKS GESTATION OF PREGNANCY: Primary | ICD-10-CM

## 2025-07-09 DIAGNOSIS — Z34.83 PRENATAL CARE, SUBSEQUENT PREGNANCY IN THIRD TRIMESTER: ICD-10-CM

## 2025-07-09 PROCEDURE — 99213 OFFICE O/P EST LOW 20 MIN: CPT | Performed by: NURSE PRACTITIONER

## 2025-07-09 NOTE — PROGRESS NOTES
Sherlyn Momin presents today for routine OB visit at 38w0d. She is a  with a history of one full term .     Blood Pressure: 110/73  Wt=84.3 kg (185 lb 12.8 oz); Body mass index is 36.29 kg/m².; TWG=6.713 kg (14 lb 12.8 oz)  Fetal Heart Rate: 130; Fundal Height (cm): 38 cm  SVE today: Cervical Dilation: 1-2 /Cervical Effacement: 20 /Fetal Station: -3  Abdomen: gravid, soft, non-tender.  She reports vaginal pressure and occasional cramping.  Denies uterine contractions.  Denies vaginal bleeding or leaking of fluid.  Reports adequate fetal movement of at least 10 movements in 2 hours once daily.    Reviewed labor precautions and fetal kick counts as well as pre-eclampsia warning signs.  Reviewed perineal massage for decreasing risk of perineal lacerations during delivery.  Advised to continue medications and return in 1 week.      Current Outpatient Medications   Medication Instructions    Prenatal Vit-Fe Fumarate-FA (Prenatal Vitamin) 27-0.8 MG TABS 1 capsule, Oral, Daily         G2 Problems (from 25 to present)       Problem Noted Diagnosed Resolved    Elevated glucose tolerance test 2025 by FRANCISCA Jason  No    Overview Addendum 2025  1:58 PM by Roseanna Negro MD   1 hr , normal 3 hr GTT         Obesity affecting pregnancy 3/15/2025 by Krista Pavon MD  No    Overview Addendum 2025  8:02 PM by Loli Mathis MD   Pre-gravid BMI 33  Current BMI 33  TWG 4.082 kg (9 lb)    Recommend 11-20 lb weight gain this pregnancy  Recommend 150 min of moderate exercise weekly  32w growth scan normal growth per notes, report not completed          38 weeks gestation of pregnancy 2/15/2025 by Loli Mathis MD  No    Overview Addendum 2025  8:01 PM by Loli Mathis MD   ANNABEL: 25  Labs  Pap smear NILM and GC/CT negative   Prenatal panel reviewed wnl except Hep B NI  MSAFP ordered 2025: not completed within timeframe  Declined NIPS. MSAFP negative.   28 weeks labs: GTT  elevated. 3 hr GTT normal.   GBS negative  Vaccines:  Flu: will offer during flu season  Tdap: received 25  Rhogam: not indicated, O pos  Contraception: undecided  Feeding plan: breast feeding and formula feeding   Birth plan:  with epidural- remains undecided  Delivery consent: signed 25  Ultrasounds:   25 16w6d: anterior placenta, normal growth .  3/12/2025 20w6d: anterior placenta, normal growth, vertex. Recommend 32w growth scan for obesity (scheduled )  25 normal growth. Return as clinically indicated.          Nonimmune to hepatitis B virus 2/15/2025 by Loli Mathis MD  No    Overview Signed 3/15/2025 11:13 PM by Krista Pavon MD   Recommend antepartum vaccination through PCP

## 2025-07-10 ENCOUNTER — PATIENT OUTREACH (OUTPATIENT)
Dept: OBGYN CLINIC | Facility: CLINIC | Age: 30
End: 2025-07-10

## 2025-07-10 NOTE — PROGRESS NOTES
SANKET [employer] rcd'd call from Pt requesting information regarding paperwork needed at labor and delivery. SANKET [employer]STER provided information and completed pre michelle assessment. Pt reported pregnancy was not intended but welcomed. Pt denies any usage of drug, alcohol or smoking. Pt denies any mental health or domestic violence hx. Pt resides with FOB, their son, her mom and friend. Pt is a  and denies any financial issues at present time.    Pt relies on her friend for transportation. Pt reported she has needed items for the unborn baby. SANKET WEBSTER discussed application for MA after delivery, PPD signs and Acknowledgement of Paternity form. Pt verbalized understanding. Pt denies other needs.

## 2025-07-15 ENCOUNTER — HOSPITAL ENCOUNTER (OUTPATIENT)
Facility: HOSPITAL | Age: 30
Discharge: HOME | End: 2025-07-15
Attending: OBSTETRICS & GYNECOLOGY | Admitting: OBSTETRICS & GYNECOLOGY

## 2025-07-15 ENCOUNTER — NURSE TRIAGE (OUTPATIENT)
Dept: OTHER | Facility: OTHER | Age: 30
End: 2025-07-15

## 2025-07-15 ENCOUNTER — TELEPHONE (OUTPATIENT)
Dept: OBGYN CLINIC | Facility: CLINIC | Age: 30
End: 2025-07-15

## 2025-07-15 ENCOUNTER — HOSPITAL ENCOUNTER (INPATIENT)
Facility: HOSPITAL | Age: 30
LOS: 2 days | Discharge: HOME | End: 2025-07-17
Attending: OBSTETRICS & GYNECOLOGY | Admitting: OBSTETRICS & GYNECOLOGY
Payer: COMMERCIAL

## 2025-07-15 VITALS
BODY MASS INDEX: 36.32 KG/M2 | SYSTOLIC BLOOD PRESSURE: 119 MMHG | WEIGHT: 185 LBS | HEIGHT: 60 IN | HEART RATE: 73 BPM | DIASTOLIC BLOOD PRESSURE: 72 MMHG | TEMPERATURE: 98 F | RESPIRATION RATE: 20 BRPM

## 2025-07-15 DIAGNOSIS — Z13.9 ENCOUNTER FOR SCREENING INVOLVING SOCIAL DETERMINANTS OF HEALTH (SDOH): ICD-10-CM

## 2025-07-15 DIAGNOSIS — Z3A.38 38 WEEKS GESTATION OF PREGNANCY: ICD-10-CM

## 2025-07-15 PROBLEM — O47.9 UTERINE CONTRACTIONS: Status: ACTIVE | Noted: 2025-07-15

## 2025-07-15 LAB
BASOPHILS # BLD AUTO: 0.03 THOUSANDS/ÂΜL (ref 0–0.1)
BASOPHILS NFR BLD AUTO: 0 % (ref 0–1)
EOSINOPHIL # BLD AUTO: 0 THOUSAND/ÂΜL (ref 0–0.61)
EOSINOPHIL NFR BLD AUTO: 0 % (ref 0–6)
ERYTHROCYTE [DISTWIDTH] IN BLOOD BY AUTOMATED COUNT: 15.1 % (ref 11.6–15.1)
HCT VFR BLD AUTO: 40.1 % (ref 34.8–46.1)
HGB BLD-MCNC: 13.8 G/DL (ref 11.5–15.4)
IMM GRANULOCYTES # BLD AUTO: 0.06 THOUSAND/UL (ref 0–0.2)
IMM GRANULOCYTES NFR BLD AUTO: 0 % (ref 0–2)
LYMPHOCYTES # BLD AUTO: 1.98 THOUSANDS/ÂΜL (ref 0.6–4.47)
LYMPHOCYTES NFR BLD AUTO: 14 % (ref 14–44)
MCH RBC QN AUTO: 29.5 PG (ref 26.8–34.3)
MCHC RBC AUTO-ENTMCNC: 34.4 G/DL (ref 31.4–37.4)
MCV RBC AUTO: 86 FL (ref 82–98)
MONOCYTES # BLD AUTO: 0.65 THOUSAND/ÂΜL (ref 0.17–1.22)
MONOCYTES NFR BLD AUTO: 5 % (ref 4–12)
NEUTROPHILS # BLD AUTO: 11.16 THOUSANDS/ÂΜL (ref 1.85–7.62)
NEUTS SEG NFR BLD AUTO: 81 % (ref 43–75)
NRBC BLD AUTO-RTO: 0 /100 WBCS
PLATELET # BLD AUTO: 355 THOUSANDS/UL (ref 149–390)
PMV BLD AUTO: 8.7 FL (ref 8.9–12.7)
RBC # BLD AUTO: 4.68 MILLION/UL (ref 3.81–5.12)
WBC # BLD AUTO: 13.88 THOUSAND/UL (ref 4.31–10.16)

## 2025-07-15 PROCEDURE — 86900 BLOOD TYPING SEROLOGIC ABO: CPT

## 2025-07-15 PROCEDURE — 85025 COMPLETE CBC W/AUTO DIFF WBC: CPT

## 2025-07-15 PROCEDURE — 99214 OFFICE O/P EST MOD 30 MIN: CPT

## 2025-07-15 PROCEDURE — 86780 TREPONEMA PALLIDUM: CPT

## 2025-07-15 PROCEDURE — NC001 PR NO CHARGE: Performed by: OBSTETRICS & GYNECOLOGY

## 2025-07-15 PROCEDURE — 86850 RBC ANTIBODY SCREEN: CPT

## 2025-07-15 PROCEDURE — 86901 BLOOD TYPING SEROLOGIC RH(D): CPT

## 2025-07-15 RX ORDER — CALCIUM CARBONATE 500 MG/1
1000 TABLET, CHEWABLE ORAL 2 TIMES DAILY PRN
Status: DISCONTINUED | OUTPATIENT
Start: 2025-07-15 | End: 2025-07-16

## 2025-07-15 RX ORDER — BUPIVACAINE HYDROCHLORIDE 2.5 MG/ML
30 INJECTION, SOLUTION EPIDURAL; INFILTRATION; INTRACAUDAL; PERINEURAL ONCE AS NEEDED
Status: DISCONTINUED | OUTPATIENT
Start: 2025-07-15 | End: 2025-07-16

## 2025-07-15 RX ORDER — ONDANSETRON 2 MG/ML
4 INJECTION INTRAMUSCULAR; INTRAVENOUS EVERY 6 HOURS PRN
Status: DISCONTINUED | OUTPATIENT
Start: 2025-07-15 | End: 2025-07-16

## 2025-07-15 RX ORDER — SODIUM CHLORIDE, SODIUM LACTATE, POTASSIUM CHLORIDE, CALCIUM CHLORIDE 600; 310; 30; 20 MG/100ML; MG/100ML; MG/100ML; MG/100ML
125 INJECTION, SOLUTION INTRAVENOUS CONTINUOUS
Status: DISCONTINUED | OUTPATIENT
Start: 2025-07-15 | End: 2025-07-16

## 2025-07-15 RX ORDER — ACETAMINOPHEN 325 MG/1
650 TABLET ORAL EVERY 6 HOURS PRN
Status: DISCONTINUED | OUTPATIENT
Start: 2025-07-15 | End: 2025-07-16

## 2025-07-15 RX ADMIN — SODIUM CHLORIDE, SODIUM LACTATE, POTASSIUM CHLORIDE, AND CALCIUM CHLORIDE 999 ML/HR: .6; .31; .03; .02 INJECTION, SOLUTION INTRAVENOUS at 23:40

## 2025-07-16 ENCOUNTER — CLINICAL DOCUMENTATION ONLY (OUTPATIENT)
Dept: NURSERY | Facility: HOSPITAL | Age: 30
End: 2025-07-16

## 2025-07-16 ENCOUNTER — ANESTHESIA (INPATIENT)
Dept: ANESTHESIOLOGY | Facility: HOSPITAL | Age: 30
End: 2025-07-16
Payer: COMMERCIAL

## 2025-07-16 ENCOUNTER — ANESTHESIA EVENT (INPATIENT)
Dept: ANESTHESIOLOGY | Facility: HOSPITAL | Age: 30
End: 2025-07-16
Payer: COMMERCIAL

## 2025-07-16 LAB
ABO GROUP BLD: NORMAL
BASE EXCESS BLDCOA CALC-SCNC: -9.7 MMOL/L (ref 3–11)
BASE EXCESS BLDCOV CALC-SCNC: -8.5 MMOL/L (ref 1–9)
BLD GP AB SCN SERPL QL: NEGATIVE
HCO3 BLDCOA-SCNC: 21.1 MMOL/L (ref 17.3–27.3)
HCO3 BLDCOV-SCNC: 18.7 MMOL/L (ref 12.2–28.6)
HOLD SPECIMEN: NORMAL
O2 CT VFR BLDCOA CALC: 7.5 ML/DL
OXYHGB MFR BLDCOA: 29.4 %
OXYHGB MFR BLDCOV: 50.3 %
PCO2 BLDCOA: 65.7 MM[HG] (ref 30–60)
PCO2 BLDCOV: 44.4 MM HG (ref 27–43)
PH BLDCOA: 7.12 [PH] (ref 7.23–7.43)
PH BLDCOV: 7.24 [PH] (ref 7.19–7.49)
PO2 BLDCOA: 19 MM HG (ref 5–25)
PO2 BLDCOV: 24.7 MM HG (ref 15–45)
RH BLD: POSITIVE
SAO2 % BLDCOV: 13 ML/DL
SPECIMEN EXPIRATION DATE: NORMAL
TREPONEMA PALLIDUM IGG+IGM AB [PRESENCE] IN SERUM OR PLASMA BY IMMUNOASSAY: NORMAL

## 2025-07-16 PROCEDURE — 0HQ9XZZ REPAIR PERINEUM SKIN, EXTERNAL APPROACH: ICD-10-PCS | Performed by: OBSTETRICS & GYNECOLOGY

## 2025-07-16 PROCEDURE — 82805 BLOOD GASES W/O2 SATURATION: CPT | Performed by: OBSTETRICS & GYNECOLOGY

## 2025-07-16 PROCEDURE — 88307 TISSUE EXAM BY PATHOLOGIST: CPT | Performed by: PATHOLOGY

## 2025-07-16 PROCEDURE — 59409 OBSTETRICAL CARE: CPT | Performed by: OBSTETRICS & GYNECOLOGY

## 2025-07-16 PROCEDURE — 99213 OFFICE O/P EST LOW 20 MIN: CPT

## 2025-07-16 RX ORDER — DOCUSATE SODIUM 100 MG/1
100 CAPSULE, LIQUID FILLED ORAL 2 TIMES DAILY
Status: CANCELLED
Start: 2025-07-17

## 2025-07-16 RX ORDER — SENNOSIDES 8.6 MG
1 TABLET ORAL DAILY
Status: DISCONTINUED | OUTPATIENT
Start: 2025-07-16 | End: 2025-07-17 | Stop reason: HOSPADM

## 2025-07-16 RX ORDER — IBUPROFEN 600 MG/1
600 TABLET, FILM COATED ORAL EVERY 6 HOURS PRN
Status: DISCONTINUED | OUTPATIENT
Start: 2025-07-16 | End: 2025-07-17 | Stop reason: HOSPADM

## 2025-07-16 RX ORDER — ONDANSETRON 2 MG/ML
4 INJECTION INTRAMUSCULAR; INTRAVENOUS EVERY 8 HOURS PRN
Status: DISCONTINUED | OUTPATIENT
Start: 2025-07-16 | End: 2025-07-17 | Stop reason: HOSPADM

## 2025-07-16 RX ORDER — DOCUSATE SODIUM 100 MG/1
100 CAPSULE, LIQUID FILLED ORAL 2 TIMES DAILY
Status: DISCONTINUED | OUTPATIENT
Start: 2025-07-16 | End: 2025-07-17 | Stop reason: HOSPADM

## 2025-07-16 RX ORDER — OXYTOCIN/0.9 % SODIUM CHLORIDE 30/500 ML
PLASTIC BAG, INJECTION (ML) INTRAVENOUS
Status: COMPLETED
Start: 2025-07-16 | End: 2025-07-16

## 2025-07-16 RX ORDER — ACETAMINOPHEN 325 MG/1
650 TABLET ORAL EVERY 4 HOURS PRN
Status: DISCONTINUED | OUTPATIENT
Start: 2025-07-16 | End: 2025-07-17 | Stop reason: HOSPADM

## 2025-07-16 RX ORDER — CALCIUM CARBONATE 500 MG/1
1000 TABLET, CHEWABLE ORAL 3 TIMES DAILY PRN
Status: DISCONTINUED | OUTPATIENT
Start: 2025-07-16 | End: 2025-07-17 | Stop reason: HOSPADM

## 2025-07-16 RX ORDER — CARBOPROST TROMETHAMINE 250 UG/ML
INJECTION, SOLUTION INTRAMUSCULAR
Status: DISCONTINUED
Start: 2025-07-16 | End: 2025-07-16 | Stop reason: WASHOUT

## 2025-07-16 RX ORDER — DIPHENHYDRAMINE HYDROCHLORIDE 50 MG/ML
25 INJECTION, SOLUTION INTRAMUSCULAR; INTRAVENOUS EVERY 6 HOURS PRN
Status: DISCONTINUED | OUTPATIENT
Start: 2025-07-16 | End: 2025-07-17 | Stop reason: HOSPADM

## 2025-07-16 RX ORDER — BENZOCAINE/MENTHOL 6 MG-10 MG
1 LOZENGE MUCOUS MEMBRANE DAILY PRN
Status: DISCONTINUED | OUTPATIENT
Start: 2025-07-16 | End: 2025-07-17 | Stop reason: HOSPADM

## 2025-07-16 RX ORDER — OXYTOCIN/0.9 % SODIUM CHLORIDE 30/500 ML
250 PLASTIC BAG, INJECTION (ML) INTRAVENOUS ONCE
Status: COMPLETED | OUTPATIENT
Start: 2025-07-16 | End: 2025-07-16

## 2025-07-16 RX ORDER — LIDOCAINE HYDROCHLORIDE AND EPINEPHRINE 15; 5 MG/ML; UG/ML
INJECTION, SOLUTION EPIDURAL AS NEEDED
Status: DISCONTINUED | OUTPATIENT
Start: 2025-07-16 | End: 2025-07-17 | Stop reason: HOSPADM

## 2025-07-16 RX ORDER — METHYLERGONOVINE MALEATE 0.2 MG/ML
INJECTION INTRAVENOUS
Status: DISCONTINUED
Start: 2025-07-16 | End: 2025-07-16 | Stop reason: WASHOUT

## 2025-07-16 RX ADMIN — WITCH HAZEL 1 PAD: 500 SOLUTION RECTAL; TOPICAL at 14:01

## 2025-07-16 RX ADMIN — SODIUM CHLORIDE, SODIUM LACTATE, POTASSIUM CHLORIDE, AND CALCIUM CHLORIDE 999 ML/HR: .6; .31; .03; .02 INJECTION, SOLUTION INTRAVENOUS at 00:20

## 2025-07-16 RX ADMIN — SENNOSIDES 8.6 MG: 8.6 TABLET, FILM COATED ORAL at 08:27

## 2025-07-16 RX ADMIN — Medication 250 MILLI-UNITS/MIN: at 01:24

## 2025-07-16 RX ADMIN — DOCUSATE SODIUM 100 MG: 100 CAPSULE, LIQUID FILLED ORAL at 17:33

## 2025-07-16 RX ADMIN — LIDOCAINE HYDROCHLORIDE AND EPINEPHRINE 2 ML: 15; 5 INJECTION, SOLUTION EPIDURAL at 00:48

## 2025-07-16 RX ADMIN — ACETAMINOPHEN 650 MG: 325 TABLET, FILM COATED ORAL at 20:13

## 2025-07-16 RX ADMIN — LIDOCAINE HYDROCHLORIDE AND EPINEPHRINE 3 ML: 15; 5 INJECTION, SOLUTION EPIDURAL at 00:45

## 2025-07-16 RX ADMIN — ACETAMINOPHEN 650 MG: 325 TABLET ORAL at 03:47

## 2025-07-16 RX ADMIN — DOCUSATE SODIUM 100 MG: 100 CAPSULE, LIQUID FILLED ORAL at 08:27

## 2025-07-16 RX ADMIN — BENZOCAINE AND LEVOMENTHOL 1 APPLICATION: 200; 5 SPRAY TOPICAL at 04:18

## 2025-07-16 RX ADMIN — HYDROCORTISONE 1 APPLICATION: 1 CREAM TOPICAL at 16:29

## 2025-07-16 RX ADMIN — IBUPROFEN 600 MG: 600 TABLET ORAL at 08:30

## 2025-07-16 RX ADMIN — IBUPROFEN 600 MG: 600 TABLET ORAL at 20:13

## 2025-07-16 RX ADMIN — BENZOCAINE AND LEVOMENTHOL 1 APPLICATION: 200; 5 SPRAY TOPICAL at 16:29

## 2025-07-16 RX ADMIN — ROPIVACAINE HYDROCHLORIDE: 2 INJECTION, SOLUTION EPIDURAL; INFILTRATION at 00:50

## 2025-07-16 NOTE — ASSESSMENT & PLAN NOTE
Suspected of placental abruption at time of delivery.  Placenta sent to pathology  Lochia WN   Recovering well   Appropriate bowel and bladder function   Pain well controlled   Tolerating diet   Breastfeeding: yes  Ambulating without issues   No lower extremity tenderness  GBS negative   Rh positive   PP contraception: depo

## 2025-07-16 NOTE — ANESTHESIA PREPROCEDURE EVALUATION
Procedure:  LABOR ANALGESIA    Relevant Problems   GYN   (+) 38 weeks gestation of pregnancy        Physical Exam    Airway     Mallampati score: II  TM Distance: >3 FB  Neck ROM: full      Cardiovascular  Cardiovascular exam normal    Dental   No notable dental hx     Pulmonary  Pulmonary exam normal     Neurological      Other Findings  post-pubertal.      Anesthesia Plan  ASA Score- 2     Anesthesia Type- regional with ASA Monitors.         Additional Monitors:     Airway Plan:            Plan Factors-Exercise tolerance (METS): >4 METS.    Chart reviewed.                      Induction-     Postoperative Plan- .   Monitoring Plan - Monitoring plan - standard ASA monitoring      Perioperative Resuscitation Plan - Level 1 - Full Code.       Informed Consent- Anesthetic plan and risks discussed with patient.        NPO Status:  No vitals data found for the desired time range.

## 2025-07-16 NOTE — ASSESSMENT & PLAN NOTE
Admit to OBGYN for labor  Clear liquid diet   F/u T&S, CBC, RPR   IVF LR 125cc/hr   Continuous fetal monitoring and tocometry   Analgesia at maternal request   Vertex by TAUS  Plan for expectant management   PP contraception undecided

## 2025-07-16 NOTE — ANESTHESIA POSTPROCEDURE EVALUATION
Post-Op Assessment Note    CV Status:  Stable  Pain Score: 0    Pain management: adequate      Post-op block assessment: catheter intact and no complications   Mental Status:  Alert and awake   Hydration Status:  Euvolemic   PONV Controlled:  Controlled   Airway Patency:  Patent     Post Op Vitals Reviewed: Yes    No anethesia notable event occurred.    Staff: CRNA         Last Filed PACU Vitals:  Vitals Value Taken Time   Temp     Pulse     BP     Resp     SpO2

## 2025-07-16 NOTE — H&P
H & P- Obstetrics   Sherlyn Momin 29 y.o. female MRN: [id number]  Unit/Bed#: LD TRIAGE 2- Encounter: [id number]    Assessment: 29 y.o.  at 38w6d admitted for labor .  SVE: /-3  FHT: cat 1  Clinical EFW: 7lb ; Cephalic confirmed by JAYNE  GBS status: negative   Postpartum contraception plan: undecided    Plan:   Elevated glucose tolerance test  Assessment & Plan  1 hr , normal 3 hr GTT    Nonimmune to hepatitis B virus  Assessment & Plan  Offer vaccination series    38 weeks gestation of pregnancy  Assessment & Plan  Admit to OBGYN for labor  Clear liquid diet   F/u T&S, CBC, RPR   IVF LR 125cc/hr   Continuous fetal monitoring and tocometry   Analgesia at maternal request   Vertex by TAUS  Plan for expectant management   PP contraception undecided      Discussed case and plan w/ Dr. Dorantes    Chief Complaint: contractions and vaginal bleeding    HPI: Sherlyn Momin is a 29 y.o.  with an ANNABEL of 2025, by Ultrasound at 38w6d who is being admitted for labor . She complains of uterine contractions, occurring every 3-5 minutes, has no LOF, and reports scant VB. She states she has felt good FM.    Problem List[1]    Baby complications/comments: none    Review of Systems   Gastrointestinal:  Positive for abdominal pain.   Genitourinary:  Positive for vaginal bleeding.   All other systems reviewed and are negative.      OB Hx:  OB History    Para Term  AB Living   2 1 1 0 0 1   SAB IAB Ectopic Multiple Live Births             # Outcome Date GA Lbr Nael/2nd Weight Sex Type Anes PTL Lv   2 Current            1 Term 2016              Obstetric Comments   NSVDx1       Past Medical Hx:  Past Medical History[2]    Past Surgical hx:  Past Surgical History[3]    Social Hx:  Alcohol use: denies  Tobacco use: denies  Other substance use: denies      Allergies[4]      Medications Prior to Admission:     Prenatal Vit-Fe Fumarate-FA (Prenatal Vitamin) 27-0.8 MG  "TABS    Objective:  Temp:  [98 °F (36.7 °C)] 98 °F (36.7 °C)  HR:  [73] 73  BP: (119)/(72) 119/72  Resp:  [20] 20  There is no height or weight on file to calculate BMI.     Physical Exam:  Physical Exam  Constitutional:       General: She is not in acute distress.  Genitourinary:      Genitourinary Comments: SVE per Dr. Briones     Cardiovascular:      Rate and Rhythm: Normal rate.      Pulses: Normal pulses.   Pulmonary:      Effort: Pulmonary effort is normal.   Abdominal:      Palpations: Abdomen is soft.      Tenderness: There is no abdominal tenderness.     Musculoskeletal:         General: No swelling.     Neurological:      General: No focal deficit present.      Mental Status: She is alert.     Skin:     General: Skin is warm.     Psychiatric:         Mood and Affect: Mood normal.         Thought Content: Thought content normal.         Judgment: Judgment normal.   Vitals reviewed. Exam conducted with a chaperone present.            FHT:  Baseline 150 bpm, moderate variability, 15 x 15 accelerations present, no decelerations    TOCO: Contractions 3 to 5 minutes on toco      Lab Results   Component Value Date    WBC 9.03 04/14/2025    HGB 12.6 04/14/2025    HCT 38.1 04/14/2025     (H) 04/14/2025     No results found for: \"NA\", \"K\", \"CL\", \"CO2\", \"BUN\", \"CREATININE\", \"GLUCOSE\", \"AST\", \"ALT\"  Prenatal Labs: Reviewed      Blood type: O positive  Antibody: negative  GBS: negative  HIV: non-reactive  Rubella: immune  Syphilis IgM/IgG: non-reactive  HBsAg: non-reactive  HCAb: non-reactive  Chlamydia: negative  Gonorrhea: negative  Diabetes 1 hour screen: Normal  Diabetes 3 hr screen: Normal  Platelets: 404,000 as of 4/14/2025  Hgb: 12.6 as of 4/14/2025  >2 Midnights  INPATIENT     Signature/Title: Raymundo Fraire DO  Date: 7/15/2025  Time: 11:22 PM         [1]   Patient Active Problem List  Diagnosis    38 weeks gestation of pregnancy    Nonimmune to hepatitis B virus    Obesity affecting pregnancy "    Elevated glucose tolerance test    Uterine contractions   [2] No past medical history on file.  [3] No past surgical history on file.  [4] No Known Allergies

## 2025-07-16 NOTE — L&D DELIVERY NOTE
DELIVERY NOTE  Sherlyn Momin 29 y.o. female MRN: [id number]  Unit/Bed#: -01 Encounter: [id number]    Obstetrician:    Dr. Shelly Dorantes MD    Assistant:   Dr. Raymundo Fraire, DO PGY-4    Pre-Delivery Diagnosis:   Problem List[1]    Post-Delivery Diagnosis:   Same as above - Delivered  Viable male fetus  1st degree laceration      Procedure:  Spontaneous vaginal delivery  Repair 1st degree spontaneous laceration        Anesthesia:  epidural    Specimens:   Cord blood obtained   Placenta; normal appearing, central insertion, intact   Arterial and venous blood gases (below)     Gases:  Umbilical Cord Venous Blood Gas:  Results from last 7 days   Lab Units 25  0133   PH COV  7.242   PCO2 COV mm HG 44.4*   HCO3 COV mmol/L 18.7   BASE EXC COV mmol/L -8.5*   O2 CT CD VB mL/dL 13.0   O2 HGB, VENOUS CORD % 50.3     Umbilical Cord Arterial Blood Gas:  Results from last 7 days   Lab Units 25  0133   PH COA  7.125*   PCO2 COA  65.7*   PO2 COA mm HG 19.0   HCO3 COA mmol/L 21.1   BASE EXC COA mmol/L -9.7*   O2 CONTENT CORD ART ml/dl 7.5   O2 HGB, ARTERIAL CORD % 29.4       Quantitative Blood Loss:   114 mL           Complications:    Suspected placental abruption    Brief Description of Labor Course:  Sherlyn Momin is a 29 y.o.  female at 39w0d who was admitted to L&D for labor. Her initial SVE was 6/80/-3.  She received an epidural for analgesia..  Shortly after epidural she had recurrent late decelerations and was found to be 9/90/-3.  After prolonged decelerations, artificial rupture of membranes with clear fluid was noted and patient quickly progressed to complete dilation.  Moderate amount of clots were noted at time of AROM in addition to clear fluid. She progressed to complete at 0111, pushed for 10 min, and delivered a healthy  at 0123.     Description of Delivery:   With  the assistance of maternal expulsive forces, the fetal vertex delivered spontaneously. A  nuchal cord was not  noted. The anterior  right shoulder was delivered atraumatically with gentle downward traction. The contralateral arm was delivered with gentle upward traction. The remainder of the fetus delivered spontaneously at 0123, resulting in a viable male .     Upon delivery, the infant was placed on the mothers abdomen and the cord was doubly clamped and cut after 30 seconds. The  was noted to have good tone and cry spontaneously. There was no evidence of injury.  The  was examined by nursing at bedside. Umbilical cord blood and umbilical artery and venous gases were collected and sent to the lab. An intact placenta was delivered spontaneously at 0125 using fundal massage and gentle cord traction and was noted to have a centrally-inserted 3-vessel cord.  There was noted to be dark clot and port wine stain fluid with placental delivery. Active management of the third stage of labor was undertaken with IV pitocin at 250 milliunits/min.  A bimanual exam was performed with small amount of clot and membranes removed from uterine fundus.  Pitocin was increased to a rate of 999 milliunits per minute.  Uterine tone improved with bimanual exam and increased Pitocin rate and was turned back to 250 milliunits/min.      Outcome:  Living  with APGARS 8 (1 min) and 9 (5 min).    weight: pending    Perineal Inspection/Laceration Repair  Inspection of the perineum, vagina, labia, cervix, and urethra revealed a none and 1st degree laceration, which was repaired with a figure of eight suture of 3-0 Vicryl Rapide.. Patient was comfortable with epidural at that time. The laceration showed good tissue reapproximation and hemostasis.    After laceration repair, a straight catheter was placed with sterile technique and cleansed with Betadine to drain her bladder as she did not previously have a Delcid prior to delivery after receiving her epidural.  200 cc of clear low yellow urine  was removed.    At the conclusion of the delivery, all needle, sponge, and instrument counts were noted to be correct. Patient tolerated the procedure well and was allowed to recover in labor and delivery room with family and  before being transferred to the post-partum floor.      Conclusion:  Mother and baby are currently recovering nicely in stable condition.    Attending Supervision:   Dr. Shelly Dorantes MD was present for the entire procedure.    Raymundo Fraire DO  OB/GYN PGY-4  2025 1:47 AM         [1]   Patient Active Problem List  Diagnosis    38 weeks gestation of pregnancy    Nonimmune to hepatitis B virus    Obesity affecting pregnancy    Elevated glucose tolerance test    Uterine contractions     (spontaneous vaginal delivery)

## 2025-07-16 NOTE — ANESTHESIA PROCEDURE NOTES
Epidural Block    Patient location during procedure: OB/L&D  Start time: 7/16/2025 12:44 AM  Reason for block: procedure for pain  Staffing  Performed by: Elen Cano CRNA  Authorized by: Hung Bowens MD    Preanesthetic Checklist  Completed: patient identified, IV checked, site marked, risks and benefits discussed, surgical consent, monitors and equipment checked, pre-op evaluation and timeout performed  Epidural  Patient position: sitting  Prep: ChloraPrep  Sedation Level: no sedation  Patient monitoring: frequent blood pressure checks, continuous pulse oximetry and heart rate  Approach: midline  Location: lumbar, L2-3  Injection technique: KEN saline  Needle  Needle type: Tuohy   Needle gauge: 17 G  Needle insertion depth: 5 cm  Catheter type: multi-orifice  Catheter size: 19 G  Catheter at skin depth: 11 cm  Catheter securement method: stabilization device and clear occlusive dressing  Test dose: negative  Assessment  Sensory level: T10  Number of attempts: 2negative aspiration for CSF, negative aspiration for heme and no paresthesia on injection  patient tolerated the procedure well with no immediate complications  Additional Notes  Needle stick x 1 l3-l4, lots of oss, one level above placed with ease. Patient had poor positioning despite

## 2025-07-16 NOTE — PLAN OF CARE
Problem: PAIN - ADULT  Goal: Verbalizes/displays adequate comfort level or baseline comfort level  Description: Interventions:  - Encourage patient to monitor pain and request assistance  - Assess pain using appropriate pain scale  - Administer analgesics as ordered based on type and severity of pain and evaluate response  - Implement non-pharmacological measures as appropriate and evaluate response  - Consider cultural and social influences on pain and pain management  - Notify physician/advanced practitioner if interventions unsuccessful or patient reports new pain  - Educate patient/family on pain management process including their role and importance of  reporting pain   - Provide non-pharmacologic/complimentary pain relief interventions  Outcome: Progressing     Problem: INFECTION - ADULT  Goal: Absence or prevention of progression during hospitalization  Description: INTERVENTIONS:  - Assess and monitor for signs and symptoms of infection  - Monitor lab/diagnostic results  - Monitor all insertion sites, i.e. indwelling lines, tubes, and drains  - Monitor endotracheal if appropriate and nasal secretions for changes in amount and color  - [place] appropriate cooling/warming therapies per order  - Administer medications as ordered  - Instruct and encourage patient and family to use good hand hygiene technique  - Identify and instruct in appropriate isolation precautions for identified infection/condition  Outcome: Progressing  Goal: Absence of fever/infection during neutropenic period  Description: INTERVENTIONS:  - Monitor WBC  - Perform strict hand hygiene  - Limit to healthy visitors only  - No plants, dried, fresh or silk flowers with holly in patient room  Outcome: Progressing     Problem: SAFETY ADULT  Goal: Patient will remain free of falls  Description: INTERVENTIONS:  - Educate patient/family on patient safety including physical limitations  - Instruct patient to call for assistance with activity   -  Consider consulting OT/PT to assist with strengthening/mobility based on AM PAC & JH-HLM score  - Consult OT/PT to assist with strengthening/mobility   - Keep Call bell within reach  - Keep bed low and locked with side rails adjusted as appropriate  - Keep care items and personal belongings within reach  - Initiate and maintain comfort rounds  - Make Fall Risk Sign visible to staff  - Offer Toileting   Outcome: Progressing  Goal: Maintain or return to baseline ADL function  Description: INTERVENTIONS:  -  Assess patient's ability to carry out ADLs; assess patient's baseline for ADL function and identify physical deficits which impact ability to perform ADLs (bathing, care of mouth/teeth, toileting, grooming, dressing, etc.)  - Assess/evaluate cause of self-care deficits   - Assess range of motion  - Assess patient's mobility; develop plan if impaired  - Assess patient's need for assistive devices and provide as appropriate  - Encourage maximum independence but intervene and supervise when necessary  - Involve family in performance of ADLs  - Assess for home care needs following discharge   - Consider OT consult to assist with ADL evaluation and planning for discharge  - Provide patient education as appropriate  - Monitor functional capacity and physical performance, use of AM PAC & JH-HLM   - Monitor gait, balance and fatigue with ambulation    Outcome: Progressing  Goal: Maintains/Returns to pre admission functional level  Description: INTERVENTIONS:  - Perform AM-PAC 6 Click Basic Mobility/ Daily Activity assessment daily.  - Set and communicate daily mobility goal to care team and patient/family/caregiver.   - Collaborate with rehabilitation services on mobility goals if consulted  - Perform Range of Motion  Outcome: Progressing     Problem: DISCHARGE PLANNING  Goal: Discharge to home or other facility with appropriate resources  Description: INTERVENTIONS:  - Identify barriers to discharge w/patient and  caregiver  - Arrange for needed discharge resources and transportation as appropriate  - Identify discharge learning needs (meds, wound care, etc.)  - Arrange for interpretive services to assist at discharge as needed  - Refer to Case Management Department for coordinating discharge planning if the patient needs post-hospital services based on physician/advanced practitioner order or complex needs related to functional status, cognitive ability, or social support system  Outcome: Progressing     Problem: Knowledge Deficit  Goal: Patient/family/caregiver demonstrates understanding of disease process, treatment plan, medications, and discharge instructions  Description: Complete learning assessment and assess knowledge base.  Interventions:  - Provide teaching at level of understanding  - Provide teaching via preferred learning methods  Outcome: Progressing

## 2025-07-16 NOTE — PLAN OF CARE
Problem: PAIN - ADULT  Goal: Verbalizes/displays adequate comfort level or baseline comfort level  Description: Interventions:  - Encourage patient to monitor pain and request assistance  - Assess pain using appropriate pain scale  - Administer analgesics as ordered based on type and severity of pain and evaluate response  - Implement non-pharmacological measures as appropriate and evaluate response  - Consider cultural and social influences on pain and pain management  - Notify physician/advanced practitioner if interventions unsuccessful or patient reports new pain  - Educate patient/family on pain management process including their role and importance of  reporting pain   - Provide non-pharmacologic/complimentary pain relief interventions  Outcome: Progressing     Problem: INFECTION - ADULT  Goal: Absence or prevention of progression during hospitalization  Description: INTERVENTIONS:  - Assess and monitor for signs and symptoms of infection  - Monitor lab/diagnostic results  - Monitor all insertion sites, i.e. indwelling lines, tubes, and drains  - Monitor endotracheal if appropriate and nasal secretions for changes in amount and color  - [place] appropriate cooling/warming therapies per order  - Administer medications as ordered  - Instruct and encourage patient and family to use good hand hygiene technique  - Identify and instruct in appropriate isolation precautions for identified infection/condition  Outcome: Progressing  Goal: Absence of fever/infection during neutropenic period  Description: INTERVENTIONS:  - Monitor WBC  - Perform strict hand hygiene  - Limit to healthy visitors only  - No plants, dried, fresh or silk flowers with holly in patient room  Outcome: Progressing     Problem: SAFETY ADULT  Goal: Patient will remain free of falls  Description: INTERVENTIONS:  - Educate patient/family on patient safety including physical limitations  - Instruct patient to call for assistance with activity   -  Consider consulting OT/PT to assist with strengthening/mobility based on AM PAC & JH-HLM score  - Consult OT/PT to assist with strengthening/mobility   - Keep Call bell within reach  - Keep bed low and locked with side rails adjusted as appropriate  - Keep care items and personal belongings within reach  - Initiate and maintain comfort rounds  - Make Fall Risk Sign visible to staff  - Offer Toileting   Outcome: Progressing  Goal: Maintain or return to baseline ADL function  Description: INTERVENTIONS:  -  Assess patient's ability to carry out ADLs; assess patient's baseline for ADL function and identify physical deficits which impact ability to perform ADLs (bathing, care of mouth/teeth, toileting, grooming, dressing, etc.)  - Assess/evaluate cause of self-care deficits   - Assess range of motion  - Assess patient's mobility; develop plan if impaired  - Assess patient's need for assistive devices and provide as appropriate  - Encourage maximum independence but intervene and supervise when necessary  - Involve family in performance of ADLs  - Assess for home care needs following discharge   - Consider OT consult to assist with ADL evaluation and planning for discharge  - Provide patient education as appropriate  - Monitor functional capacity and physical performance, use of AM PAC & JH-HLM   - Monitor gait, balance and fatigue with ambulation    Outcome: Progressing  Goal: Maintains/Returns to pre admission functional level  Description: INTERVENTIONS:  - Perform AM-PAC 6 Click Basic Mobility/ Daily Activity assessment daily.  - Set and communicate daily mobility goal to care team and patient/family/caregiver.   - Collaborate with rehabilitation services on mobility goals if consulted  - Perform Range of Motion  Outcome: Progressing     Problem: DISCHARGE PLANNING  Goal: Discharge to home or other facility with appropriate resources  Description: INTERVENTIONS:  - Identify barriers to discharge w/patient and  caregiver  - Arrange for needed discharge resources and transportation as appropriate  - Identify discharge learning needs (meds, wound care, etc.)  - Arrange for interpretive services to assist at discharge as needed  - Refer to Case Management Department for coordinating discharge planning if the patient needs post-hospital services based on physician/advanced practitioner order or complex needs related to functional status, cognitive ability, or social support system  Outcome: Progressing     Problem: Knowledge Deficit  Goal: Patient/family/caregiver demonstrates understanding of disease process, treatment plan, medications, and discharge instructions  Description: Complete learning assessment and assess knowledge base.  Interventions:  - Provide teaching at level of understanding  - Provide teaching via preferred learning methods  Outcome: Progressing

## 2025-07-16 NOTE — DISCHARGE SUMMARY
Discharge Summary - OB/GYN   Name: Sherlyn Momin 29 y.o. female I MRN: [id number]  Unit/Bed#: -01 I Date of Admission: 7/15/2025   Date of Service: 2025 I Hospital Day: 2    ADMISSION  Patient of: [facility]  Admission Date: 7/15/2025   Admitting Attending: Dr. Shelly Dorantes  Admitting Diagnoses: Problem List[1]    DELIVERY  Delivery Method: Vaginal, Spontaneous   Delivery Date and Time: 2025 1:23 AM  Delivery Attending: Shelly Dorantes    DISCHARGE  Discharge Date: 25  Discharge Attending: Dr. Britney Sharif  Discharge Diagnosis:   Same, Delivered    Brief Description of Labor Course:  Sherlyn Momin is a 29 y.o.  female at 39w0d who was admitted to L&D for labor. Her initial SVE was 6/80/-3.  She received an epidural for analgesia..  Shortly after epidural she had recurrent late decelerations and was found to be 9/90/-3.  After prolonged decelerations, artificial rupture of membranes with clear fluid was noted and patient quickly progressed to complete dilation.  Moderate amount of clots were noted at time of AROM in addition to clear fluid. She progressed to complete at 0111, pushed for 10 min, and delivered a healthy  at 0123.     Delivery  Route of Delivery: Vaginal, Spontaneous    Anesthesia: Epidural,   QBL: Non-Surgical QBL (mL): 114        Delivery: Vaginal, Spontaneous at 2025 1:23 AM  Laceration: Perineal: 1° Repaired? Yes    Baby's Weight: 3305 g (7 lb 4.6 oz); 116.58    Apgar scores: 8  and 9  at 1 and 5 minutes, respectively    Clinical Course: Post-Delivery:  The post delivery course was unremarkable.    On the day of discharge, the patient was ambulating, voiding spontaneously, tolerating oral intake, and hemodynamically stable. She was able to reasonably perform all ADLs. She had appropriate bowel function. Pain was well-controlled. She was discharged home on postpartum/postop day #1 without complications. Patient was instructed  to follow up with her OB as an outpatient and was given appropriate warnings to call her provider with problems or concerns.    Pertinent lab findings included:   Blood type O+.     Last three Hgb values:  Lab Results   Component Value Date    HGB 13.8 07/15/2025    HGB 12.6 2025    HGB 12.1 2025        Problem-specific follow-up plans included the following:  Assessment & Plan   (spontaneous vaginal delivery)  Suspected of placental abruption at time of delivery.  Placenta sent to pathology  [facility]   Recovering well   Appropriate bowel and bladder function   Pain well controlled   Tolerating diet   Breastfeeding: yes  Ambulating without issues   No lower extremity tenderness  GBS negative   Rh positive   PP contraception: depo  Nonimmune to hepatitis B virus  Discussed HBV vaccine, patient would like to initiate today      Discharge med list:  Contraception: depo     Medication List      START taking these medications     acetaminophen 325 mg tablet; Commonly known as: TYLENOL; Take 2 tablets   (650 mg total) by mouth every 4 (four) hours as needed for mild pain   benzocaine-menthol-lanolin-aloe 20-0.5 % topical spray; Commonly known   as: DERMOPLAST; Apply 1 Application topically every 6 (six) hours as   needed for mild pain   calcium carbonate 500 mg chewable tablet; Commonly known as: TUMS; Chew   2 tablets (1,000 mg total) 3 (three) times a day as needed for indigestion   or heartburn   hydrocortisone 1 % cream; Apply 1 Application topically daily as needed   for irritation   ibuprofen 600 mg tablet; Commonly known as: MOTRIN; Take 1 tablet (600   mg total) by mouth every 6 (six) hours as needed for mild pain   senna 8.6 mg; Commonly known as: SENOKOT; Take 1 tablet (8.6 mg total)   by mouth daily   witch hazel-glycerin topical pad; Commonly known as: TUCKS; Apply 1 Pad   topically every 4 (four) hours as needed for irritation     CONTINUE taking these medications     Prenatal Vitamin 27-0.8  MG Tabs; Take 1 capsule by mouth in the morning       Condition at discharge:   good     Disposition:   Home    Planned Readmission:   No    Wilda Jung MD  (they/them)  Obstetrics & Gynecology PGY-1           [1]   Patient Active Problem List  Diagnosis    38 weeks gestation of pregnancy    Nonimmune to hepatitis B virus    Obesity affecting pregnancy    Elevated glucose tolerance test    Uterine contractions     (spontaneous vaginal delivery)

## 2025-07-16 NOTE — DISCHARGE INSTR - AVS FIRST PAGE
Self Care After Delivery   Postpartum Care Resources  [facility] Baby & Me Support Center:  Baby and Me is a support center designed to promote the physical and mental health of families. [facility] Baby & Me Support [facility] delivers personalized and compassionate care that includes prenatal and  classes, Support Groups, Lactation Support Services, Post-Partum Emotional Wellness Services, Activities/Exercise     [facility] [place] [address]  [phone]  [email or web address]     [facility] Physical Therapy for Pelvic Health  Physical Therapy at [facility]’s team of trained female therapists offer a wide variety of services designed to address the special healthcare needs of women. Our specially trained clinicians work with you to address your concerns and come beside you to support and encourage you through the healing process. Our offices are designed to keep your sensitive treatments private at all times. We are here to ensure your comfort and mentor you through our pelvic floor therapy programs at your pace.  [email or web address]     The postpartum period  is the period of time from delivery to about 6 weeks. During this time you may experience many physical and emotional changes. It is important to understand what is normal and when you need to call your healthcare provider. It is also important to know how to care for yourself during this time.  Call your local emergency number (911 in the [place]) for any of the following:   You see or hear things that are not there, or have thoughts of harming yourself.  You soak through 1 pad in 15 minutes, have blurry vision, clammy or pale skin, and feel faint.  You faint or lose consciousness.  You have trouble breathing.  You cough up blood.     Seek care immediately if:   Your heart is beating faster than usual.  You have  a bad headache or changes in your vision.  Your episiotomy or  incision is red, swollen, bleeding, or draining pus.  You have severe abdominal pain.     Call your doctor or obstetrician if:   Your leg is painful, red, and larger than usual.  You soak through 1 or more pads in an hour, or pass blood clots larger than a quarter from your vagina.  You have a fever.  You have new or worsening pain in your abdomen or vagina.  You continue to have depression 1 to 2 weeks after you deliver.  You have trouble sleeping.  You have foul-smelling discharge from your vagina.  You have pain or burning when you urinate.  You do not have a bowel movement for 3 days or more.  You have nausea or are vomiting.  You have hard lumps or red streaks over your breasts.  You have cracked nipples or bleed from your nipples.  You have questions or concerns about your condition or care.     Physical changes:  The following are normal changes after you give birth:  Pain in the area between your anus and vagina  Breast pain  Constipation or hemorrhoids  Hot or cold flashes  Vaginal bleeding or discharge  Mild to moderate abdominal cramping  Difficulty controlling bowel movements or urine     Emotional changes:  A drop in hormone levels after you deliver may cause changes in your emotions. You may feel irritable, sad, or anxious. You may cry easily or for no reason. You may also feel depressed. Depression that continues can be a sign of postpartum depression, a condition that can be treated. Treatment may include talk therapy, medicines, or both. Healthcare providers will ask how you are feeling and if you have any depression. These talks can happen during appointments for your medical care. Providers can help you find ways to care for yourself. Talk to your providers about the following:  When emotional changes or depression started, and if it is getting worse over time  Problems you are having with daily activities or sleep  If  anything makes you feel worse, or makes you feel better  Support you have from friends, family, or others     Breast care for non-breastfeeding mothers:  Milk will fill your breasts naturally. Do the following to help stop your milk from filling your breasts and causing pain:  Wear a bra with support at all times.  A sports bra or a tight-fitting bra will help stop your milk from coming in.  Apply ice on each breast for 15 to 20 minutes every hour or as directed.  Use an ice pack, or put crushed ice in a plastic bag. Cover it with a towel before you apply it to your breast. Ice helps your milk ducts shrink.  Keep your breasts away from warm water.  Warm water will make it easier for milk to fill your breasts. Stand with your breasts away from warm water in the shower.  Limit how much you touch your breasts.  This will prevent them from filling with milk.     Perineum care:  Your perineum is the area between your rectum and vagina. It is normal to have swelling and pain in this area after you give birth. If you had an episiotomy, your healthcare provider may give you special instructions.  Clean your perineum after you use the bathroom.  This may prevent infection and help with healing. Use a spray bottle with warm water to clean your perineum. You may also gently spray warm water against your perineum when you urinate. Always wipe front to back.  Take a sitz bath as directed.  A sitz bath may help relieve swelling and pain. Fill your bath tub or bucket with water up to your hips and sit in the water. Use cold water for 2 days after you deliver. Then use warm water. Ask your healthcare provider for more information about a sitz bath.  Apply ice packs for the first 24 hours or as directed.  Use a plastic glove filled with ice or buy an ice pack. Wrap the ice pack or plastic glove in a small towel or wash cloth. Place the ice pack on your perineum for 20 minutes at a time.  Sit on a donut-shaped pillow.  This may  relieve pressure on your perineum when you sit.  Use wipes that contain medicine or take pills as directed.  Your healthcare provider may tell you to use witch hazel pads. You can place witch hazel pads in the refrigerator before you apply them to your perineum. Your provider may also tell you to take NSAIDs. Ask him or her how often to take pills or use the wipes.  Do not go swimming or take tub baths for 4 to 6 weeks or as directed.  This will help prevent an infection in your vagina or uterus.     Bowel and bladder care:  It may take 3 to 5 days to have a bowel movement after your delivery. You can do the following to prevent or manage constipation, and get control of your bowel or bladder:  Take stool softeners as directed.  A stool softener is medicine that will make your bowel movements softer. This may prevent or relieve constipation. A stool softener may also make bowel movements less painful.  Drink plenty of liquids.  Ask how much liquid to drink each day and which liquids are best for you. Liquids may help prevent constipation.  Eat foods high in fiber.  Examples include fruits, vegetables, grains, beans, and lentils. Ask your healthcare provider how much fiber you need each day. Fiber may prevent constipation.          Do Kegel exercises as directed.  Kegel exercises will help strengthen the muscles that control bowel movements and urination. Ask your healthcare provider for more information on Kegel exercises.  Apply cold compresses or medicine to hemorrhoids as directed.  This may relieve swelling and pain. Your healthcare provider may tell you to apply ice or wipes that contain medicine to your hemorrhoids. He or she may also tell you to use a sitz bath. Ask your provider for more information on how to manage hemorrhoids.     Nutrition:  Good nutrition is important in the postpartum period. It will help you return to a healthy weight, increase your energy levels, and prevent constipation. It will also  help you get enough nutrients and calories if you are going to breastfeed.  Eat a variety of healthy foods.  Healthy foods include fruits, vegetables, whole-grain breads, low-fat dairy products, beans, lean meats, and fish. You may need 500 to 700 extra calories each day if you breastfeed. You may also need extra protein.          Limit foods with added sugar and high amounts of fat.  These foods are high in calories and low in healthy nutrients. Read food labels so you know how much sugar and fat is in the food you want to eat.  Drink 8 to 10 glasses of water per day.  Water will help you make plenty of milk. It will also help prevent constipation. Drink a glass of water regularly.  Take vitamins as directed.  Ask your healthcare provider what vitamins you need.  Limit caffeine and alcohol if you are breastfeeding.  Caffeine and alcohol can get into your breast milk. Ask your healthcare provider if you can drink alcohol or caffeine.     Rest and sleep:  You may feel very tired in the postpartum period. Enough sleep will help you heal. The following may help you get sleep and rest:  Nap when you can.  Get rest during this time.  Limit visitors.  Many people may want to see you. Ask friends or family to visit on different days. This will give you time to rest.  Do not plan too much for one day.  Put off household chores so that you have time to rest. Gradually do more each day.  Ask for help from family, friends, or neighbors.  Ask them to help you with laundry, cleaning, or errands.      Exercise after delivery:  Wait until your healthcare provider says it is okay to exercise. Exercise can help you lose weight, increase your energy levels, and manage your mood. It can also prevent constipation and blood clots. Start with gentle exercises such as walking. Do more as you have more energy. You may need to avoid abdominal exercises for 1 to 2 weeks after you deliver. Talk to your healthcare provider about an exercise  plan that is right for you.        Sexual activity after delivery:   Do not have sex until your healthcare provider says it is okay. You may need to wait 4 to 6 weeks before you have sex. This may prevent infection and allow time to heal.  Your menstrual cycle may begin as soon as 3 weeks after you deliver. Your period may be delayed if you breastfeed. You can become pregnant before you get your first postpartum period. Talk to your healthcare provider about birth control that is right for you. Some types of birth control are not safe during breastfeeding.     For support and more information:  Join a support group for new mothers. Ask for help from family and friends with chores and errands.  Office of Women's Health,  Department of Health and Human Services  [address] [place] [address]  [address] [place] [address]  Phone: [phone]  Web Address: www.womenshealth.gov     [facility] Postpartum Care  [address] [place] [address]  Web Address: [email or web address]     Follow up with your doctor or obstetrician as directed:  You will need to follow up within 2 to 6 weeks of delivery. Write down your questions so you remember to ask them at your visits.     © Copyright [employer] 2022 Information is for End User's use only and may not be sold, redistributed or otherwise used for commercial purposes. All illustrations and images included in CareNotes® are the copyrighted property of [employer] or [employer]  The above information is an  only. It is not intended as medical advice for individual conditions or treatments. Talk to your doctor, nurse or pharmacist before following any medical regimen to see if it is safe and effective for you.        Breastfeeding   Benefits of breastfeeding  Are less likely to  develop allergies  Have increased protection against bacterial meningitis  Have fewer middle ear infections  Have fewer learning disabilities  Have fewer behavioral difficulties  Have higher IQ's  Have lower rates of respiratory illness  Have lower obesity  Are less likely to develop juvenile diabetes and some childhood cancers  Are less likely to die from Sudden Infant Death Syndrome  A healthier baby means fewer visits to the doctor and the pharmacy.  Breastfeeding is environmentally friendly. There is nothing to throw away.   Breastfeeding is free; formula can cost over $1000 for the first year of life.   There is less vaginal bleeding immediately after delivery and a faster return to your pre-pregnant size.  Mothers who breastfeed a lifetime total of 2 years have:  A 40% decreased risk of breast cancer   A decreased risk of ovarian cancer  Lower rates of osteoporosis, diabetes and heart disease.     Importance of exclusive breastfeeding  By providing the ideal food for the healthy growth and development of infants, exclusive  infants receive only breast milk.   Exclusive breastfeeding for the first 6 months is very important to improve an infant's health and reduce childhood illness.      Exclusive breastfeeding for the first 6 months  The American Academy of Pediatrics recommends exclusive breastfeeding for the first six months and continued breastfeeding with supplementation of solids for the next 6 months.      Early initiation of breastfeeding  Women who feed their infants within the first hour of birth is referred to as “early initiation of breastfeeding” and ensures that the  receives colostrum.   Colostrum is rich in antibodies and essential nutrients.      Rooming-In  May stabilize 's breathing and heart rate  Reduce crying  Improve ability for  to maintain temperature and blood sugar levels  Early stimulation of baby's immune system  Calming effects  Easier and faster  bonding   Rooming-in promotes getting to know your .  Rooming-in makes breastfeeding easier.  Women who room-in with their newborns make more milk and produce a good milk supply earlier.  Becomes easier to recognize baby's feeding cues  Infants have longer breastfeeding sessions.  Women who room-in with their newborns are more likely to exclusively breastfeed compared to women who are  from their newborns.   Skin-to-Skin  Skin to skin contact should happen regardless of a woman's feeding preference or the mode of delivery.  After the delivery of your baby, it's important that a healthy mother and her baby have uninterrupted skin-to-skin contact.  Skin to skin contact should occur immediately after birth for a least one-two hours and until after the first feeding for breastfeeding mothers.  Skin-to-skin contact means placing dried, unclothes newborns on their mother's bare chest, with warm blankets covering the baby's back.  All routine procedures such as maternal and  assessments can take place during skin-to-skin contact or can be delayed until after the sensitive period immediately after birth.      Postpartum Birth Control Options   Almost half of all pregnancies are not planned, which can sometimes be a happy surprise, but other times can be a stressful situation for a woman or family. Birth control can be an empowering tool for women to take control of their family planning so that they can have healthy pregnancies in the future if and when they want. We recommend at least 6 months, ideally 18 months, between delivery to conception of the next pregnancy.      Implant  The birth control implant, brand name Nexplanon, is a small matchstick sized device that is placed under the skin of your upper arm. This releases a small amount of hormone daily that prevents pregnancy for up to 3 years of use. You can have the implant inserted after delivery prior to being discharged from the hospital or  at any time in your OBGYN office depending on insurance coverage.  Pros  - Less than 1 out of 100 women will get pregnant in 1 year using this method  - Once it is placed you do not have to do anything else to prevent pregnancy, like remembering to take a daily pill   - Once removed, the implant is immediately reversible and you will return to your normal ability to get pregnant  - While there is a theoretical risk of low milk supply when these methods are started right after birth, there is no research to support this theory. The implant is considered safe for use in breastfeeding mothers  Cons  - Most common side effects of the implant include changes in your period- which can be heavier, lighter, spotting in between periods or stopping your periods all together- it depends on the person  - Other side effects include headaches and acne     Intrauterine Device (IUD)  An intrauterine device (IUD) is a small T shaped device that is placed into the uterus. The IUD comes in 2 forms: hormonal and non-hormonal  - The non-hormonal IUD or copper IUD has been approved up to 10 years of use   - The hormonal IUD comes in forms that are approved for 3-6 years of use    This device can be placed immediately following delivery, 4 weeks after delivery or any time following that in your OBGYN office depending on insurance coverage.  Pros  - Less than 1 out of 100 women will get pregnant in 1 year using this method  - Once it is placed you do not have to do anything else to not get pregnant, like remembering to take a daily pill  - Many women have lighter periods or no periods at all on the hormonal IUD.   - Once removed, the IUD is immediately reversible and you will return to your normal ability to get pregnant  - While there is a theoretical risk of low milk supply when these methods are started right after birth, there is no research to support this theory. The IUD is considered safe for use in breastfeeding mothers  Cons  -  "Possibility of IUD falling out of the uterus               - Occurs in approximately 2-5% of women   - If your IUD is placed immediately after delivery the risk of it falling out is slightly higher at 10-27%  - Hormonal IUD can result in irregular spotting in the first 3-6 months that usually normalizes  - Non-hormonal IUD may cause heavier and more crampy periods in the first few months of use, which tends to improve after the first year of use  - Other side effects of the hormonal IUD include headaches and acne     Birth Control Shot  The birth control shot, brand name Depo Provera, is given every 3 months to prevent pregnancy. This injection can be received before being discharged from the hospital, following the delivery of your child or at any time at your OBGYN office.   Pros  - About 4 out of 100 women will get pregnant in 1 year using this method  - You do not need to remember to take a daily pill but you do need to remember to get the injection at your OBGYN office every 3 months  - While there is a theoretical risk of low milk supply when this birth control are started right after birth, there is no research to support this theory. The birth control shot is considered safe for use in breastfeeding mothers.  Cons  - Most common side effect of the birth control injection is irregular bleeding; this usually normalizes after 1 year of use  - After your last shot, sometimes your ability to get pregnant can be delayed by as long as 1 year  - Weight gain, headaches, and mood changes are side effects that some women may experience  - Decreased bone mineral density can occur with long-term use; however, this is reversible once you stop using the injection and does not increase your lifetime risk of osteoporosis     Progesterone-Only Pills  The \"mini pill\" is a pill that must be taken once daily at the same time each day. This can be started as soon as you go home from the hospital after delivery, or at any " time.  Pros  - About 8 out of 100 women will get pregnant in 1 year using this method  - You have complete control of when to start and stop taking the pill  - Many women have lighter periods or no periods at all while taking this pill  - While there is a theoretical risk of low milk supply when this birth control are started right after birth, there is no research to support this theory. The progesterone only pill is considered safe for use in breastfeeding mothers  Cons  - You must take the pill at the same time every day. If you miss one day, you need another form of pregnancy prevention for at least 7 days  - Some people have irregular, unscheduled bleeding while taking the pill  - Other side effects include headaches and acne     Combined Hormonal Contraceptives (Pill, Patch, Ring)  These methods all contain 2 hormones, estrogen and progesterone. The pill is taken once daily, the patch is worn on the skin and changed once weekly, the ring is placed vaginally and changed once monthly. These methods can be started 4-6 weeks after giving birth.  Pros  - About 8 out of 100 women will get pregnant in 1 year using this method  - You have complete control of when to start and stop using these methods  - Many women have lighter, less painful, regular periods while taking this pill  Cons  - Recommend against use while breastfeeding as estrogen can decrease milk supply  - You must take the pill at the same time every day. If you miss one day, you need another form of pregnancy prevention for at least 7 days     Male/Female condoms, withdrawal, fertility awareness  These are methods that you either buy over the counter or do yourself. Condoms and withdrawal must be used with every sexual act. Fertility awareness must be assessed every day. Condoms and withdrawal can be used immediately after delivery once your doctor has performed your postpartum office exam. Fertility awareness can be started after return of your  period.  Pros  - You have complete control of when to start and stop using these methods  - No issue with use and breastfeeding  Cons  - About 15-25 out of 100 women will get pregnant in 1 year using this method  - Condoms and withdrawal must be used with every sexual act  - Fertility awareness must be assessed every day

## 2025-07-17 VITALS
OXYGEN SATURATION: 97 % | BODY MASS INDEX: 36.32 KG/M2 | DIASTOLIC BLOOD PRESSURE: 62 MMHG | HEIGHT: 60 IN | SYSTOLIC BLOOD PRESSURE: 103 MMHG | HEART RATE: 77 BPM | RESPIRATION RATE: 18 BRPM | WEIGHT: 185 LBS | TEMPERATURE: 97.9 F

## 2025-07-17 PROCEDURE — 99024 POSTOP FOLLOW-UP VISIT: CPT | Performed by: OBSTETRICS & GYNECOLOGY

## 2025-07-17 PROCEDURE — NC001 PR NO CHARGE: Performed by: OBSTETRICS & GYNECOLOGY

## 2025-07-17 PROCEDURE — 90746 HEPB VACCINE 3 DOSE ADULT IM: CPT | Performed by: STUDENT IN AN ORGANIZED HEALTH CARE EDUCATION/TRAINING PROGRAM

## 2025-07-17 RX ORDER — ACETAMINOPHEN 325 MG/1
650 TABLET ORAL EVERY 4 HOURS PRN
Qty: 30 TABLET | Refills: 0 | Status: SHIPPED | OUTPATIENT
Start: 2025-07-17

## 2025-07-17 RX ORDER — CALCIUM CARBONATE 500 MG/1
1000 TABLET, CHEWABLE ORAL 3 TIMES DAILY PRN
Start: 2025-07-17

## 2025-07-17 RX ORDER — MEDROXYPROGESTERONE ACETATE 150 MG/ML
150 INJECTION, SUSPENSION INTRAMUSCULAR ONCE
Status: COMPLETED | OUTPATIENT
Start: 2025-07-17 | End: 2025-07-17

## 2025-07-17 RX ORDER — IBUPROFEN 600 MG/1
600 TABLET, FILM COATED ORAL EVERY 6 HOURS PRN
Qty: 30 TABLET | Refills: 0 | Status: SHIPPED | OUTPATIENT
Start: 2025-07-17

## 2025-07-17 RX ORDER — SENNOSIDES 8.6 MG
8.6 TABLET ORAL DAILY
Start: 2025-07-17

## 2025-07-17 RX ORDER — BENZOCAINE/MENTHOL 6 MG-10 MG
1 LOZENGE MUCOUS MEMBRANE DAILY PRN
Qty: 1.5 G | Refills: 0 | Status: SHIPPED | OUTPATIENT
Start: 2025-07-17

## 2025-07-17 RX ADMIN — IBUPROFEN 600 MG: 600 TABLET ORAL at 12:11

## 2025-07-17 RX ADMIN — IBUPROFEN 600 MG: 600 TABLET ORAL at 18:41

## 2025-07-17 RX ADMIN — SENNOSIDES 8.6 MG: 8.6 TABLET, FILM COATED ORAL at 10:00

## 2025-07-17 RX ADMIN — MEDROXYPROGESTERONE ACETATE 150 MG: 150 INJECTION, SUSPENSION INTRAMUSCULAR at 10:13

## 2025-07-17 RX ADMIN — ACETAMINOPHEN 650 MG: 325 TABLET, FILM COATED ORAL at 12:11

## 2025-07-17 RX ADMIN — HEPATITIS B VACCINE (RECOMBINANT) 20 MCG: 20 INJECTION, SUSPENSION INTRAMUSCULAR at 16:34

## 2025-07-17 RX ADMIN — DOCUSATE SODIUM 100 MG: 100 CAPSULE, LIQUID FILLED ORAL at 10:00

## 2025-07-17 RX ADMIN — IBUPROFEN 600 MG: 600 TABLET ORAL at 06:10

## 2025-07-17 RX ADMIN — ACETAMINOPHEN 650 MG: 325 TABLET, FILM COATED ORAL at 06:10

## 2025-07-17 RX ADMIN — ACETAMINOPHEN 650 MG: 325 TABLET, FILM COATED ORAL at 18:41

## 2025-07-17 NOTE — PROGRESS NOTES
Progress Note - OB/GYN   Name: Sherlyn Momin 29 y.o. female I MRN: [id number]  Unit/Bed#: -01 I Date of Admission: 7/15/2025   Date of Service: 2025 I Hospital Day: 1     Assessment & Plan   (spontaneous vaginal delivery)  Suspected of placental abruption at time of delivery.  Placenta sent to pathology  Lochia WNL   Recovering well   Appropriate bowel and bladder function   Pain well controlled   Tolerating diet   Breastfeeding: yes  Ambulating without issues   No lower extremity tenderness  GBS negative   Rh positive   PP contraception: depo  Nonimmune to hepatitis B virus  Discussed HBV vaccine, patient would like to initiate today    OB Post-Partum Progress Note  Subjective   Post delivery. Patient is doing well. Lochia WNL. Pain well controlled. She would like to speak to lactation prior to discharge today.    Pain: yes, cramping, improved with meds  Tolerating PO: yes  Voiding: yes  Flatus: yes  BM: yes  Ambulating: yes  Breastfeeding:  yes  Chest pain: no  Shortness of breath: no  Leg pain: no  Lochia: WNL    Objective :  Temp:  [97.7 °F (36.5 °C)-98.5 °F (36.9 °C)] 98.1 °F (36.7 °C)  HR:  [] 77  BP: ()/(50-71) 100/58  Resp:  [16-20] 18  SpO2:  [94 %-100 %] 99 %  O2 Device: None (Room air)    Physical Exam  Physical Exam:   GEN: appears well, alert and oriented x 3, pleasant and cooperative   CV: Regular rate  RESP: non labored breathing  ABDOMEN: soft, no tenderness, no distention, Uterine fundus firm and non-tender, -2 cm below the umbilicus  EXTREMITIES: non-tender  NEURO Alert and oriented to person, place, and time.       Lab Results: I have reviewed the following results:  Lab Results   Component Value Date    WBC 13.88 (H) 07/15/2025    HGB 13.8 07/15/2025    HCT 40.1 07/15/2025    MCV 86 07/15/2025     07/15/2025     Wilda Jung MD  (they/them)  Obstetrics & Gynecology PGY-1  2025  5:40 AM

## 2025-07-17 NOTE — CASE MANAGEMENT
Case Management Progress Note    Patient name Sherlyn Momin  Location /-01 MRN [id number]  : 1995 Date 2025       LOS (days): 2  Geometric Mean LOS (GMLOS) (days):   Days to GMLOS:        OBJECTIVE:        Current admission status: Inpatient  Preferred Pharmacy:   [id number] [place] - [address]   [place] [address]  Phone: [phone] Fax: [phone]    Primary Care Provider: No primary care provider on file.    Primary Insurance: [employer]END[employer]  Secondary Insurance:     PROGRESS NOTE:    CM met with MOB and FOB at bedside to introduce self and role using [employer] . MOB report having all supplies for baby.They report no concerns and no need for resources/information on assistance for housing, bills, food, etc. They report no questions or concerns at this time.     MOB and baby cleared of CM needs at this time.

## 2025-07-17 NOTE — CONSULTS
This note was copied from a baby's chart.  CONSULT - LACTATION  Baby Boy (Smiley Momin 1 days male MRN: [id number]    [facility] Room / Bed: (N)/(N) Encounter: [id number]    Maternal Information     MOTHER:  Sherlyn Medina  Maternal Age: 29 y.o.  OB History: # 1 - Date: 2016, Sex: None, Weight: None, GA: None, Type: None, Apgar1: None, Apgar5: None, Living: None, Birth Comments: None    # 2 - Date: 07/16/25, Sex: Male, Weight: 3305 g (7 lb 4.6 oz), GA: 39w0d, Type: Vaginal, Spontaneous, Apgar1: 8, Apgar5: 9, Living: Living, Birth Comments: None   Previous breast reduction surgery? No    Lactation history:   Has patient previously breast fed: Yes   How long had patient previously breast fed: over 2+ years   Previous breast feeding complications: None   No past surgical history on file.    Birth information:  YOB: [birth year]25   Time of birth: 1:23 AM   Sex: male   Delivery type: Vaginal, Spontaneous   Birth Weight: 3305 g (7 lb 4.6 oz)   Percent of Weight Change: -2%     Gestational Age: 39w0d   [unfilled]    Reason for Consult:    Reason for Consult: Initial assessment (ext) - 20 min, Latch Assess (ext) - 20 min, Pump Setup - 15 min, Supplementation (ext) - 20 min, Discharge (routine) - 10 min    Risk Factors:    Risk Factors: Maternal anatomy (nipples face downward)    Breast and nipple assessment:   Breasts/Nipples  Left Breast: Soft, Filling  Right Breast: Soft, Filling  Left Nipple: Everted, Tender  Right Nipple: Everted, Tender  Intervention: Lanolin, Hand expression  Breastfeeding Progress: Not yet established, Other issues (comment) (cultural belief in no milk)  Reasons for not Breastfeeding: Maternal preference (cultrual belief)    OB Lactation Tools:    Other OB Lactation Tools  Feeding Devices: Bottle, Lanolin    Breast Pump:    Breast Pump  Pump: Personal, Manual (smaller flanges provided)  Pump Review/Education: Setup,  frequency, and cleaning, Milk storage, Other (Comment) (cycle & hands on pumping)  Initiated by: c hume, ibclc       Assessment: small gape; high suck reflex and palate. ; tongue elevates; frenulum is thin, tight, and anchored to mid-anterior on the tongue; bilateral laterization    Feeding assessment: feeding well  LATCH:  Latch: Repeated attempts, hold nipple in mouth, stimulate to suck   Audible Swallowing: Spontaneous and intermittent (24 hours old)   Type of Nipple: Everted (After stimulation)   Comfort (Breast/Nipple): Filling, red/small blisters/bruises, mild/moderate discomfort   Hold (Positioning): Partial assist, teach one side, mother does other, staff holds   LATCH Score: 7         Feeding recommendations:   Selena #[id number]. Mom has a hand pump in the room. Mom states she desires to breastfeed. Mom has a cultural belief in no milk. Ed. On how to est. Milk supply. Demonstration and teach back of hand expression. Visible colostrum.     Demonstration of laid back position on the left breast.     [place] Latch After Lactation Education/Consult:  Efficiency: laid back on the left breast;               Lips Flanged: Yes              Depth of latch: deeper cheeks touching the breast              Audible Swallow: Yes              Visible Milk: Yes, with HE              Wide Open/ Asymmetrical: Yes, better as feeding continues              Suck Swallow Cycle: Breathing: yes, Coordinated: yes, better as feeding continues  Nipple Assessment after latch: Normal: elongated/eraser, no discoloration and no damage noted.  Latch Problems: alignment; positioning    Position After Lactation Education/Consult:  Infant's Ergonomics/Body               Body Alignment: Yes, belly to belly               Head Supported: Yes               Close to Mom's body/ Lifted/ Supported: Yes, enc. Shoulder compression               Mom's Ergonomics/Body: Yes                           Supported: Yes, pillows under the arm                            Sitting Back: Yes                           Brings Baby to her breast: Yes, with demonstration and teach back  Positioning Problems: alignment; positioning up to the breast.     Baby latched deeply and continued to have longer suckling at the breast as he continued to feed.    Mom has a hand pump and flange sizing was provided. Mom has an s2 from retail. No ins.     Feeding Plan:     Early Feeding Cues:     First feeding cues include:  - a change in the baby's breathing pattern,   - sighing,   - rapid eye movement without opening his/her eyelids, or   - any early feeding cue signs located on the back of the Feeding log,  Begin hand expression and set yourself up to bring the baby up to the breast.     If your baby is getting close to the time that a feeding should begin:   - undress your baby and bring your baby skin to skin,   - have your baby's head in between your breasts, head turned to the side, so your baby's ear is listening to your heart.     Once your baby begins to stir, place your baby in a breastfeeding position that is comfortable and support your baby up to the breast. Always bring your baby to the breast, never the breast to your baby.    Education on positioning and alignment. Mom is encouraged to:     - Bring baby up to the breast (use of pillows to elevate so baby's torso is against mom's breasts)   - Skin to skin for feedings with top hand exposed to show signs of satiation   - Chin deep into breast tissue (make baby look up to the nipple)   - nose aligned to the nipple   -Wait for wide gape, place chin behind the areola on the breast so nipple is at the nose. Once baby opens their mouth wide, the nipple will be aimed at the upper, back palate  - Cheeks should be touching breast, and baby should have a long neck   - Ear, shoulder, hip will be in alignment   - Deep, snug hold of baby up to the breast   - Every baby knows how to breathe at the breast. The tip of the nose may  "appear to touch the breast. Babies breathe from the side of the nasal passages. If baby can not breathe due to improper alignment, baby will unlatch    Education on creating a snug hold of your infant to the breast by verifying the infant's cheek is touching the breast, your infant's chin is deep into the breast tissue, your infant's arms are \"hugging\" the breast, and your infant's lips are flanged on the areola. Bring infant to the breast, not your breast to the infant. Latch should feel like a tugging sensation on the nipple.    Nurse on demand: when baby gives hunger cues; when your breasts feel full, or at least every 3 hours during the day and every 5 hours at night counting from the beginning of one feeding to the beginning of the next; which ever comes first. When sucking and swallowing slow, gently compress the breast to restart flow. If active suck-swallow does not restart, gently remove the baby and offer the other breast; offering up to \"four\" breasts per feeding.    Timing of feeds  - Start feedings on breast that last feeding ended   - allow no more than 3 hours between breast feeding sessions   - time between feedings is counted from the beginning of the first feed to the beginning of the next feeding session    Signs  Reviewed early signs of hunger, including tensing of hands and shoulders - no need to wait for open eyes.  Crying is a late hunger sign.  If baby is crying, soothe baby first and then attempt to latch.  Reviewed normal sucking patterns: transition from stimulation to nutritive to release or non-nutritive. The goal is to see and hear lots of swallowing.  Reviewed normal nursing pattern: infant could latch on one breast up to 30 minutes or until releases on own. Signs of satiation is open hand with fingers that do not grab your finger.  Discussed difference in sensation of non-nutritive v nutritive sucking    Ed. On 3rd party distributors that offer different flanges on-line. ie) Amazon. " Names of reputable companies like [employer] and [employer] offer flanges for every double electric pump. Lactation Hub will also provide a set of 12 mm to 19 mm flanges to fit most flanges.Enc. To try smaller flange and place a small amount of lanolin where the tunnel and funnel meet.     Ed. On various flange sizes. Ed. On various flange circumferences that may fit the mother's breast better. Ed. On possible use of Pumping Pals or LacTeck flanges. Encouraged parents to call lactation once edema has dissipated to reassess the flange sizing.     Spectra Education on turning on the pump, press the 3 wavy lines to place pump on stimulation mode (high cycle, low vacuum) Set vacuum to comfort with light suction. After 3 min, press 3 wavy lines and change setting to Expression mode (low Cycle, High vacuum) Vacuum setting should not pinch, only tug the nipple. Now pump is set. Next time mom pumps, will only need to turn on pump and press 3 wavy line button to change cycle three times in a pumping session.     Mix Feeds:   Start every feeding at the breast. Offer both breasts or one breast and use breast compressions to achieve active suckling. Once baby is not actively suckling, bring baby in upright position and offer expressed milk and/or artificial supplementation via alternative feeding method (syringe, finger, paced bottle feeding). Burp frequently between breasts and during paced bottle feeding. Once feed is complete, place baby back on breast for on-nutritive suck. Pump after the feeding session to supplement with expressed milk at next feed.      (Scan QR code for [employer] - positions)   Review Milkmob on youtube or scan QR code for MilkMob video      Milk Mob        [employer] - positions        Christy Hume, MA 7/17/2025 4:25 PM

## 2025-07-17 NOTE — PLAN OF CARE
Problem: PAIN - ADULT  Goal: Verbalizes/displays adequate comfort level or baseline comfort level  Description: Interventions:  - Encourage patient to monitor pain and request assistance  - Assess pain using appropriate pain scale  - Administer analgesics as ordered based on type and severity of pain and evaluate response  - Implement non-pharmacological measures as appropriate and evaluate response  - Consider cultural and social influences on pain and pain management  - Notify physician/advanced practitioner if interventions unsuccessful or patient reports new pain  - Educate patient/family on pain management process including their role and importance of  reporting pain   - Provide non-pharmacologic/complimentary pain relief interventions  Outcome: Progressing     Problem: INFECTION - ADULT  Goal: Absence or prevention of progression during hospitalization  Description: INTERVENTIONS:  - Assess and monitor for signs and symptoms of infection  - Monitor lab/diagnostic results  - Monitor all insertion sites, i.e. indwelling lines, tubes, and drains  - Monitor endotracheal if appropriate and nasal secretions for changes in amount and color  - [place] appropriate cooling/warming therapies per order  - Administer medications as ordered  - Instruct and encourage patient and family to use good hand hygiene technique  - Identify and instruct in appropriate isolation precautions for identified infection/condition  Outcome: Progressing  Goal: Absence of fever/infection during neutropenic period  Description: INTERVENTIONS:  - Monitor WBC  - Perform strict hand hygiene  - Limit to healthy visitors only  - No plants, dried, fresh or silk flowers with holly in patient room  Outcome: Progressing     Problem: SAFETY ADULT  Goal: Patient will remain free of falls  Description: INTERVENTIONS:  - Educate patient/family on patient safety including physical limitations  - Instruct patient to call for assistance with activity   -  Consider consulting OT/PT to assist with strengthening/mobility based on AM PAC & JH-HLM score  - Consult OT/PT to assist with strengthening/mobility   - Keep Call bell within reach  - Keep bed low and locked with side rails adjusted as appropriate  - Keep care items and personal belongings within reach  - Initiate and maintain comfort rounds  - Make Fall Risk Sign visible to staff  - Offer Toileting   Outcome: Progressing  Goal: Maintain or return to baseline ADL function  Description: INTERVENTIONS:  -  Assess patient's ability to carry out ADLs; assess patient's baseline for ADL function and identify physical deficits which impact ability to perform ADLs (bathing, care of mouth/teeth, toileting, grooming, dressing, etc.)  - Assess/evaluate cause of self-care deficits   - Assess range of motion  - Assess patient's mobility; develop plan if impaired  - Assess patient's need for assistive devices and provide as appropriate  - Encourage maximum independence but intervene and supervise when necessary  - Involve family in performance of ADLs  - Assess for home care needs following discharge   - Consider OT consult to assist with ADL evaluation and planning for discharge  - Provide patient education as appropriate  - Monitor functional capacity and physical performance, use of AM PAC & JH-HLM   - Monitor gait, balance and fatigue with ambulation    Outcome: Progressing  Goal: Maintains/Returns to pre admission functional level  Description: INTERVENTIONS:  - Perform AM-PAC 6 Click Basic Mobility/ Daily Activity assessment daily.  - Set and communicate daily mobility goal to care team and patient/family/caregiver.   - Collaborate with rehabilitation services on mobility goals if consulted  - Perform Range of Motion  Outcome: Progressing     Problem: DISCHARGE PLANNING  Goal: Discharge to home or other facility with appropriate resources  Description: INTERVENTIONS:  - Identify barriers to discharge w/patient and  caregiver  - Arrange for needed discharge resources and transportation as appropriate  - Identify discharge learning needs (meds, wound care, etc.)  - Arrange for interpretive services to assist at discharge as needed  - Refer to Case Management Department for coordinating discharge planning if the patient needs post-hospital services based on physician/advanced practitioner order or complex needs related to functional status, cognitive ability, or social support system  Outcome: Progressing     Problem: Knowledge Deficit  Goal: Patient/family/caregiver demonstrates understanding of disease process, treatment plan, medications, and discharge instructions  Description: Complete learning assessment and assess knowledge base.  Interventions:  - Provide teaching at level of understanding  - Provide teaching via preferred learning methods  Outcome: Progressing     Problem: POSTPARTUM  Goal: Experiences normal postpartum course  Description: INTERVENTIONS:  - Monitor maternal vital signs  - Assess uterine involution and lochia  Outcome: Progressing  Goal: Appropriate maternal -  bonding  Description: INTERVENTIONS:  - Identify family support  - Assess for appropriate maternal/infant bonding   -Encourage maternal/infant bonding opportunities  - Referral to  or  as needed  Outcome: Progressing  Goal: Establishment of infant feeding pattern  Description: INTERVENTIONS:  - Assess breast/bottle feeding  - Refer to lactation as needed  Outcome: Progressing  Goal: Incision(s), wounds(s) or drain site(s) healing without S/S of infection  Description: INTERVENTIONS  - Assess and document dressing, incision, wound bed, drain sites and surrounding tissue  - Provide patient and family education  - Perform skin care/dressing changes   Outcome: Progressing

## 2025-07-18 PROCEDURE — 88307 TISSUE EXAM BY PATHOLOGIST: CPT | Performed by: PATHOLOGY

## 2025-07-18 NOTE — CONSULTS
This note was copied from a baby's chart.  Discharge consult: : Shayy # [id number] Ed. On how to est. Milk supply. Ed. On positioning at the breast. Assisted with deeper latch on the left breast. Hand pump provided as mom does not has medical ins. Ed. On use of spectra pump from retail purchase.     Demonstration of hand pump and smaller flanges provided (19 mm flanges).    Ed. On [facility] guidelines on cleaning and sanitizing pump parts.     Enc. Baby and me.     Ed. And Demonstration on paced bottle feeding and use of empty volume feeders - provided    [facility] Guidelines in Cleaning your Pump Parts    https://www.cdc.gov/hygiene/about/about-breast-pump-hygiene.html#cdc_health_safety_special_topic_types-sanitize-for-extra-protection      Ed. On 3rd party distributors that offer different flanges on-line. ie) [employer]. Names of reputable companies like [employer] and [employer] offer flanges for every double electric pump. Lactation Hub will also provide a set of 12 mm to 19 mm flanges to fit most flanges.Enc. To try smaller flange and place a small amount of lanolin where the tunnel and funnel meet.     Ed. On various flange sizes. Ed. On various flange circumferences that may fit the mother's breast better. Ed. On possible use of Pumping Pals or LacTeck flanges. Encouraged parents to call lactation once edema has dissipated to reassess the flange sizing.         [email or web address]_[email or web address] ([email or web address])    Feed expressed milk or formula as needed/desired. Paced bottle feeding technique is less stressful for your baby, prevents overfeeding and protects the breastfeeding relationship.  You can find a video about paced bottle feeding at www.lacted.org or MilkMob on YouTube.    Milk Supply:   - Allow for non-nutritive suck at the breast to stimulate supply   - Allow for skin to skin during and after each breastfeeding session   - Use massage, heat, and hand expression prior to feedings to assist with deep latch   -  Increase pumping sessions and pump after every feeding

## 2025-07-18 NOTE — PLAN OF CARE
Problem: PAIN - ADULT  Goal: Verbalizes/displays adequate comfort level or baseline comfort level  Description: Interventions:  - Encourage patient to monitor pain and request assistance  - Assess pain using appropriate pain scale  - Administer analgesics as ordered based on type and severity of pain and evaluate response  - Implement non-pharmacological measures as appropriate and evaluate response  - Consider cultural and social influences on pain and pain management  - Notify physician/advanced practitioner if interventions unsuccessful or patient reports new pain  - Educate patient/family on pain management process including their role and importance of  reporting pain   - Provide non-pharmacologic/complimentary pain relief interventions  7/17/2025 2009 by Ana Banerjee RN  Outcome: Completed  7/17/2025 0853 by Ana Banerjee RN  Outcome: Progressing